# Patient Record
Sex: FEMALE | Race: WHITE | NOT HISPANIC OR LATINO | Employment: PART TIME | URBAN - METROPOLITAN AREA
[De-identification: names, ages, dates, MRNs, and addresses within clinical notes are randomized per-mention and may not be internally consistent; named-entity substitution may affect disease eponyms.]

---

## 2022-07-01 ENCOUNTER — INITIAL PRENATAL (OUTPATIENT)
Dept: OBSTETRICS AND GYNECOLOGY | Facility: CLINIC | Age: 22
End: 2022-07-01

## 2022-07-01 VITALS
BODY MASS INDEX: 23.25 KG/M2 | WEIGHT: 118.4 LBS | HEIGHT: 60 IN | DIASTOLIC BLOOD PRESSURE: 80 MMHG | SYSTOLIC BLOOD PRESSURE: 127 MMHG

## 2022-07-01 DIAGNOSIS — S32.591D CLOSED FRACTURE OF MULTIPLE RAMI OF RIGHT PUBIS WITH ROUTINE HEALING, SUBSEQUENT ENCOUNTER: ICD-10-CM

## 2022-07-01 DIAGNOSIS — Z34.00 SUPERVISION OF NORMAL FIRST PREGNANCY, ANTEPARTUM: Primary | ICD-10-CM

## 2022-07-01 DIAGNOSIS — N63.0 BREAST MASS IN FEMALE: ICD-10-CM

## 2022-07-01 DIAGNOSIS — R11.0 NAUSEA: ICD-10-CM

## 2022-07-01 PROBLEM — F39 MOOD DISORDER: Status: ACTIVE | Noted: 2021-06-29

## 2022-07-01 LAB
B-HCG UR QL: POSITIVE
EXPIRATION DATE: ABNORMAL
GLUCOSE UR STRIP-MCNC: NEGATIVE MG/DL
INTERNAL NEGATIVE CONTROL: NEGATIVE
INTERNAL POSITIVE CONTROL: POSITIVE
Lab: ABNORMAL
PROT UR STRIP-MCNC: NEGATIVE MG/DL

## 2022-07-01 PROCEDURE — 99204 OFFICE O/P NEW MOD 45 MIN: CPT | Performed by: OBSTETRICS & GYNECOLOGY

## 2022-07-01 PROCEDURE — 81025 URINE PREGNANCY TEST: CPT | Performed by: OBSTETRICS & GYNECOLOGY

## 2022-07-01 RX ORDER — PROMETHAZINE HYDROCHLORIDE 12.5 MG/1
12.5 TABLET ORAL EVERY 6 HOURS PRN
Qty: 30 TABLET | Refills: 0 | Status: SHIPPED | OUTPATIENT
Start: 2022-07-01 | End: 2022-12-21

## 2022-07-01 RX ORDER — VITAMIN C, CALCIUM, IRON, VITAMIN D3, VITAMIN E, VITAMIN B1, VITAMIN B2, VITAMIN B3, VITAMIN B6, FOLIC ACID, IODINE, ZINC, COPPER, DOCUSATE SODIUM, DOCOSAHEXAENOIC ACID (DHA) 27-1-50 MG
1 KIT ORAL DAILY
Qty: 90 EACH | Refills: 4 | Status: SHIPPED | OUTPATIENT
Start: 2022-07-01 | End: 2022-12-21

## 2022-07-01 NOTE — PATIENT INSTRUCTIONS
"Nausea/vomiting in pregnancy:  To help with nausea and vomiting you may try the following over the counter products:  Tatiana chews, tatiana tea, tatiana gum  Mint tea, peppermint gum or candy  B6/Doxylamine: to be taken daily, not just when symptoms occur  Pyridoxine (also known as B6): 10 to 25 mg orally every six to eight hours; the maximum treatment dose suggested for pregnant women is 200 mg/day.  Doxylamine (available in some over-the-counter sleeping pills (eg, Unisom Sleep Tabs) and as an antihistamine chewable tablet (Aldex AN)). One-half of the 25 mg over-the-counter tablet or two chewable 5 mg tablets can be used off-label as an antiemetic  The Association of [employer] has released a smart phone application that can help you monitor and manage your symptoms.  The Application is \"Managing NVP,\" and has a green icon that says \"APGO WELLMOM.\"    If these do not work, we may need to try prescription medications.    Please contact us if you are unable to tolerate liquids (even sips of water) for over six to eight hours, have weight loss of over 10% of your body weight, blood in vomit, fever (temp of 100.4 or higher), or other concerning symptoms arise.       The following are CPT codes for the optional tests in pregnancy:  Cystic fibrosis screenin  Spinal Muscular atrophy screening 44036  Cell free DNA (Trisomy 21, 18, 13 and sex chromosomes) 12280    The ICD 10 code for most pregnancies is Z34.90     Travel During Pregnancy:  Always use seatbelts.  A lap belt should be worn below the abdomen (across the hips) and the shoulder belt should be worn across the center of your chest (between the breasts) away from your neck.  Do not put the shoulder belt under your arm or behind your back.  Pull any slack out of the belt.  Air travel is safe in most uncomplicated pregnancies, but we do not recommend air travel past 36 weeks.  Airlines may also have restrictions, so check with your " airline before flying.  For some international flights, the travel cut off may be as early as 28 weeks gestation, and some airlines may require letters from your physician.  When going on long trips in car, plane, train, or bus, frequent ambulation is important to prevent blood clots in legs and/or lungs.  The following may help with prevention of blood clots in legs: Drink lots of fluid, wear loose-fitting clothing, walk and stretch at regular intervals.    Avoid areas with Zika outbreaks.  For the latest information, you may visit: www.cdc.gov/travel/notices/.  Resources: , , Committee Opinion 455  Nutrition during pregnancy  Average weight gain during pregnancy is based on your pre-pregnancy body mass index (BMI).  See below for recommended weight gain:  Underweight (BMI <18.5), we recommend 28 to 40lb weight gain  Normal weight (BMI 18.5 to 24.9), we recommend a 25 to 35lb weight gain  Overweight (BMI 25-29.9), we recommend a 15 to 25lb weight gain  Obese (BMI >30), we recommend keeping weight gain under 20 lbs.    You should speak with your physician regarding your specific weight goals for this pregnancy.   Foods to avoid include:   Fish: avoid certain types of fish such as shark, swordfish, jonathan mackerel, tilefish.  Limit white (albacore) tuna to 6 oz per week.  Choose fish and shellfish such as shrimp, salmon, catfish, Century.  Food-borne illness: Pregnant women are much more likely to get Listeriosis than non-pregnant women.  To help prevent this, avoid eating unpasteurized milk and unpasteurized milk products, hot dogs/lunch meat/cold cuts unless they are heated until steaming right before serving, refrigerated meat spreads, refrigerated smoked seafood, raw or undercooked seafood/eggs/meat.   Vitamin D helps development of the baby’s bones and teeth.  Good sources of Vitamin D include milk fortified with Vitamin D and fatty fish such as salmon.    Folic acid, also known as folate, helps  develop the baby’s brain and spine.  You should make sure your vitamin contains extra folic acid - at least 400mcg.    Iron helps make red blood cells.  You need to make extra red blood cell sin pregnancy.  We recommend eating Iron-rich foods such as lean red meat, poultry, fish, dried beans and peas, iron-fortified cereals, and prune juice.  You may be recommended an iron supplement.  If so, it is absorbed more easily if taken with vitamin C-rich foods such as citrus fruits or tomatoes.    It is important to eat a well balanced diet.  A good recourse for nutrition recommendations is: www.choosemyplate.gov.  Limit caffeine intake to 200mg daily.  Some coffees/teas/sodas have very different levels of caffeine per serving, so check the nutrition labeling.   Resources: , Committee Opinion 548  Exercise during pregnancy  If you are healthy and your pregnancy is normal, it is safe to continue or start most types of exercise.   Physical activity does not increase your risk of miscarriage, low birth weight or early delivery, but you should discuss specific limitations or any complications with your physician.    Benefits of exercise during pregnancy include: reduced back pain, less constipation, promotes overall health and healthy weight gain which may decrease risks for certain pregnancy complications such as diabetes and/or preeclampsia.  The CDC recommends 150 minutes of moderate intensity aerobic exercise per week.  Moderate intensity means that you are moving enough to raise your heart rate and start sweating, but you can talk normally.  Brisk walking, swimming/water workouts, modified yoga/pilates, and use of elliptical machines and/or stationary bikes are examples of aerobic activity.    Precautions:  Stay hydrated.  Drink plenty of water before, during and after your workout  Wear supportive clothing such as a sports bra  Do not become overheated.  Do not exercise outside when it is very hot or humid  Avoid  lying flat on your back as much as possible  AVOID contact sports, skydiving, sports that risk falling (such as skiing, surfing, off-road cycling, gymnastics, horseback riding), hot yoga/hot pilates, scuba diving  Stop exercising if you experience: bleeding from the vagina, feeling dizzy/faint, shortness of breath before starting exercise, chest pain, headache, muscle weakness, calf pain or swelling, regular uterine contractions, fluid leaking from the vagina.    Postpartum exercise: Continuing exercise after you deliver your baby will help boost your energy, strengthen muscles, promote better sleep, and relieve stress.  It also may be useful in preventing postpartum depression.  150 minutes of moderate-intensity aerobic activity is recommended.  Types of exercise and when you can start a regular exercise routine may be limited by the type of delivery you had.  Please discuss with your physician prior to resuming or starting exercise.    Resources: ,   Back pain during pregnancy  Back pain is very common during pregnancy.  It may arise due to strain on your back muscles, weakness of the abdominal muscles, and pregnancy hormones.    To prevent back pain:  Wear shoes with good support. Flat shoes and high heels may not have good arch support.  Consider a firm mattress  Use good lifting practices.  Do not bend from the waist to pick things up.  Squat down and bend your knees, keeping a straight back.  Sit in chairs with good back support or use a small pillow behind your lower back.    Sleep on your side with one or two pillows between your legs  To ease back pain:   Exercise can help stretch strained muscles and strengthen weak muscles to promote good posture  Contact your health care provider with severe pain, pain that persists for more than 2 weeks, fever, burning during urination, or vaginal bleeding.  Resources:

## 2022-07-01 NOTE — PROGRESS NOTES
Initial OB Visit    Chief Complaint   Patient presents with   • Initial Prenatal Visit        Odette Gar is being seen today for her first obstetrical visit.  She is a 22 y.o.    6w1d gestation by unsure LMP  FOB: [place] [place], boyfriend here with her today  This is not a planned pregnancy.   OB History    Para Term  AB Living   1             SAB IAB Ectopic Molar Multiple Live Births                    # Outcome Date GA Lbr Mitch/2nd Weight Sex Delivery Anes PTL Lv   1 Current                Current obstetric complaints: nausea, some vomiting   Prior obstetric issues, potential pregnancy concerns: N/A  Family history of genetic issues (includes FOB): denies  Prior infections concerning in pregnancy (Rash, fever since LMP): denies  Varicella Hx:immune by vaccination  Prior genetic testing: reports negative cancer screening, denies h/o CF or SMA screening  History of abnormal pap smears: denies  History of STIs: chlamydia - 2 years ago  History of HSV in self or partner? denies  Prepregnancy weight 123lb      Past Medical History:   Diagnosis Date   • Pelvic fracture (HCC)     See at [facility]        Past Surgical History:   Procedure Laterality Date   • KNEE ACL RECONSTRUCTION Left          Current Outpatient Medications:   •  Prenat w/o A-FeCbGl-DSS-FA-DHA (PNV OB+DHA) 27-1 & 250 MG misc, Take 1 tablet by mouth Daily., Disp: 90 each, Rfl: 4  •  PRENATAL GUMMY VITAMIN, Chew 1 each Daily., Disp: , Rfl:   •  promethazine (PHENERGAN) 12.5 MG tablet, Take 1 tablet by mouth Every 6 (Six) Hours As Needed for Nausea or Vomiting., Disp: 30 tablet, Rfl: 0    No Known Allergies    Social History     Socioeconomic History   • Marital status: Single   Tobacco Use   • Smoking status: Former Smoker   Substance and Sexual Activity   • Alcohol use: Not Currently   • Drug use: Yes     Types: Marijuana     Comment: stopped 22       Family History   Problem Relation Age of Onset   • Breast cancer Paternal  "Grandmother 40   • Uterine cancer Maternal Great-Grandmother    • Ovarian cancer Maternal Great-Grandmother    • Lung cancer Maternal Great-Grandmother        Review of systems   See HPI       Objective    /80   Ht 152.4 cm (60\")   Wt 53.7 kg (118 lb 6.4 oz)   LMP 05/19/2022   BMI 23.12 kg/m²       General Appearance:    Alert, cooperative, in no acute distress, habitus normal   Head:    Normocephalic, without obvious abnormality, atraumatic   Eyes:            Lids and lashes normal, conjunctivae and sclerae normal, no   icterus, no pallor, corneas clear   Ears:    Ears appear intact with no abnormalities noted       Neck:   No adenopathy, supple, trachea midline, no thyromegaly   Back:     No kyphosis present, no scoliosis present,                       Lungs:     Clear to auscultation,respirations regular, even and                   unlabored    Heart:    Regular rhythm and normal rate, normal S1 and S2, no            murmur, no gallop, no rub, no click   Breast Exam:    Mobile 2cm mass at 12 o'clock on right breast, mobile <0.5cm mass feeling superficial at 3 o'clock on left breast; No nipple discharge   Abdomen:     Normal bowel sounds, no masses, no organomegaly, soft        non-tender, non-distended, no guarding, no rebound                 tenderness   Genitalia:    Vulva - BUS-WNL, NEFG    Vagina - No discharge, No bleeding    Cervix - No Lesions, closed     Uterus - Consistent with 6 weeks    Adnexa - No masses, NT    Pelvimetry - clinically adequate, gynecoid pelvis     Extremities:   Moves all extremities well, no edema, no cyanosis, no              redness   Pulses:   Pulses palpable and equal bilaterally   Skin:   No bleeding, bruising or rash   Lymph nodes:   No palpable adenopathy   Neurologic:   Sensation intact, A&O times 3      Assessment  Pregnancy at 6w1d   Diagnosis Plan   1. Supervision of normal first pregnancy, antepartum  Hemoglobin A1c    OB Panel With HIV    Chlamydia trachomatis, " Neisseria gonorrhoeae, Trichomonas vaginalis, PCR - Swab, Cervix    Drug Profile Urine - 9 Drugs - Urine, Clean Catch    Urine Culture - Urine, Urine, Clean Catch    Inheritest Core(CF97,SMA,FraX) - Blood,    HCG, B-subunit, Quantitative    US Ob Transvaginal    POC Pregnancy, Urine   2. Pelvic Rami Fracture June 2021, healed     3. Nausea     4. Breast mass in female  US Breast Bilateral Complete        Plan    Initial labs drawn, GC/CHL screen done  Patient is on Prenatal vitamins  Problem list reviewed and updated.  Reviewed routine prenatal care with the office to include but not limited to:   Questions regarding activity and food restrictions, avoidance of alcohol, tobacco and drugs and saunas/hot tubs.  Reviewed nature of practice and hospital.  Reviewed recommended follow up, importance of compliance with care. We reviewed testing in pregnancy including HIV testing and urine drug screen.    Reviewed aneuploidy screening and CF/SMA screening.  We reviewed limitations of testing, possibility of false positive/negative results, possible need for other tests as indicated.    Desires CF/SMA/Fragile X  Counseled on limitations of ultrasound in pregnancy in detecting aneuploidy/fetal anomalies    Reviewed note from [facility] July 2021 - rami fracture healed and well reduced.  Breast mass- ordered ultrasound bilateral. Was told she had cyst on right breast in past but no prior imaging  Reviewed Body mass index is 23.12 kg/m²..  In pregnancy, her recommended weight gain is 35lbs.  In the first trimester, caloric demand is typically not increased.  In the second and third trimester, the increased demand is approximately 350 and 450 calories respectively.    All questions answered.   Return in about 4 weeks (around 7/29/2022).      Gladis Slater MD

## 2022-07-02 LAB
ABO GROUP BLD: NORMAL
BASOPHILS # BLD AUTO: 0.1 X10E3/UL (ref 0–0.2)
BASOPHILS NFR BLD AUTO: 1 %
BLD GP AB SCN SERPL QL: NEGATIVE
EOSINOPHIL # BLD AUTO: 0 X10E3/UL (ref 0–0.4)
EOSINOPHIL NFR BLD AUTO: 0 %
ERYTHROCYTE [DISTWIDTH] IN BLOOD BY AUTOMATED COUNT: 11.7 % (ref 11.7–15.4)
HBA1C MFR BLD: 5.2 % (ref 4.8–5.6)
HBV SURFACE AG SERPL QL IA: NEGATIVE
HCG INTACT+B SERPL-ACNC: NORMAL MIU/ML
HCT VFR BLD AUTO: 37.9 % (ref 34–46.6)
HCV AB S/CO SERPL IA: <0.1 S/CO RATIO (ref 0–0.9)
HGB BLD-MCNC: 12.9 G/DL (ref 11.1–15.9)
HIV 1+2 AB+HIV1 P24 AG SERPL QL IA: NON REACTIVE
IMM GRANULOCYTES # BLD AUTO: 0 X10E3/UL (ref 0–0.1)
IMM GRANULOCYTES NFR BLD AUTO: 0 %
LYMPHOCYTES # BLD AUTO: 1.4 X10E3/UL (ref 0.7–3.1)
LYMPHOCYTES NFR BLD AUTO: 23 %
MCH RBC QN AUTO: 30.3 PG (ref 26.6–33)
MCHC RBC AUTO-ENTMCNC: 34 G/DL (ref 31.5–35.7)
MCV RBC AUTO: 89 FL (ref 79–97)
MONOCYTES # BLD AUTO: 0.6 X10E3/UL (ref 0.1–0.9)
MONOCYTES NFR BLD AUTO: 10 %
NEUTROPHILS # BLD AUTO: 4 X10E3/UL (ref 1.4–7)
NEUTROPHILS NFR BLD AUTO: 66 %
PLATELET # BLD AUTO: 226 X10E3/UL (ref 150–450)
RBC # BLD AUTO: 4.26 X10E6/UL (ref 3.77–5.28)
RH BLD: NEGATIVE
RPR SER QL: NON REACTIVE
RUBV IGG SERPL IA-ACNC: 1.48 INDEX
WBC # BLD AUTO: 6.1 X10E3/UL (ref 3.4–10.8)

## 2022-07-03 LAB
BACTERIA UR CULT: NORMAL
BACTERIA UR CULT: NORMAL

## 2022-07-04 ENCOUNTER — TELEPHONE (OUTPATIENT)
Dept: OBSTETRICS AND GYNECOLOGY | Facility: CLINIC | Age: 22
End: 2022-07-04

## 2022-07-05 ENCOUNTER — TELEPHONE (OUTPATIENT)
Dept: OBSTETRICS AND GYNECOLOGY | Facility: CLINIC | Age: 22
End: 2022-07-05

## 2022-07-05 LAB
C TRACH RRNA SPEC QL NAA+PROBE: NEGATIVE
N GONORRHOEA RRNA SPEC QL NAA+PROBE: NEGATIVE
T VAGINALIS RRNA SPEC QL NAA+PROBE: NEGATIVE

## 2022-07-05 NOTE — TELEPHONE ENCOUNTER
Please let patient know that results so far are normal.  Her blood type is AB negative.  In this case, we would recommend a shot called Rhogam if she were to have bleeding or standardly at 28 weeks.

## 2022-07-08 ENCOUNTER — TELEPHONE (OUTPATIENT)
Dept: OBSTETRICS AND GYNECOLOGY | Facility: CLINIC | Age: 22
End: 2022-07-08

## 2022-07-08 LAB
AMPHETAMINES UR QL SCN: NEGATIVE NG/ML
BARBITURATES UR QL SCN: NEGATIVE NG/ML
BENZODIAZ UR QL: NEGATIVE NG/ML
BZE UR QL: NEGATIVE NG/ML
CANNABINOIDS UR CFM-MCNC: POSITIVE NG/ML
METHADONE UR QL SCN: NEGATIVE NG/ML
OPIATES UR QL: NEGATIVE NG/ML
PCP UR QL: NEGATIVE NG/ML
PROPOXYPH UR QL SCN: NEGATIVE NG/ML

## 2022-07-09 ENCOUNTER — HOSPITAL ENCOUNTER (OUTPATIENT)
Facility: HOSPITAL | Age: 22
Setting detail: OBSERVATION
Discharge: HOME | End: 2022-07-10
Attending: EMERGENCY MEDICINE | Admitting: EMERGENCY MEDICINE

## 2022-07-09 DIAGNOSIS — O21.1 HYPEREMESIS GRAVIDARUM BEFORE END OF 22 WEEK GESTATION WITH CARBOHYDRATE DEPLETION: Primary | ICD-10-CM

## 2022-07-09 PROBLEM — O21.0 HYPEREMESIS GRAVIDARUM: Status: ACTIVE | Noted: 2022-07-09

## 2022-07-09 LAB
ALBUMIN SERPL-MCNC: 4.7 G/DL (ref 3.5–5.2)
ALBUMIN/GLOB SERPL: 1.8 G/DL
ALP SERPL-CCNC: 53 U/L (ref 39–117)
ALT SERPL W P-5'-P-CCNC: 10 U/L (ref 1–33)
ANION GAP SERPL CALCULATED.3IONS-SCNC: 13 MMOL/L (ref 5–15)
AST SERPL-CCNC: 11 U/L (ref 1–32)
BASOPHILS # BLD AUTO: 0.05 10*3/MM3 (ref 0–0.2)
BASOPHILS NFR BLD AUTO: 0.5 % (ref 0–1.5)
BILIRUB SERPL-MCNC: 0.5 MG/DL (ref 0–1.2)
BILIRUB UR QL STRIP: NEGATIVE
BUN SERPL-MCNC: 10 MG/DL (ref 6–20)
BUN/CREAT SERPL: 16.1 (ref 7–25)
CALCIUM SPEC-SCNC: 9.7 MG/DL (ref 8.6–10.5)
CHLORIDE SERPL-SCNC: 102 MMOL/L (ref 98–107)
CLARITY UR: CLEAR
CO2 SERPL-SCNC: 23 MMOL/L (ref 22–29)
COLOR UR: YELLOW
CREAT SERPL-MCNC: 0.62 MG/DL (ref 0.57–1)
DEPRECATED RDW RBC AUTO: 37 FL (ref 37–54)
EGFRCR SERPLBLD CKD-EPI 2021: 129.3 ML/MIN/1.73
EOSINOPHIL # BLD AUTO: 0.01 10*3/MM3 (ref 0–0.4)
EOSINOPHIL NFR BLD AUTO: 0.1 % (ref 0.3–6.2)
ERYTHROCYTE [DISTWIDTH] IN BLOOD BY AUTOMATED COUNT: 11.6 % (ref 12.3–15.4)
GLOBULIN UR ELPH-MCNC: 2.6 GM/DL
GLUCOSE SERPL-MCNC: 91 MG/DL (ref 65–99)
GLUCOSE UR STRIP-MCNC: NEGATIVE MG/DL
HCG INTACT+B SERPL-ACNC: NORMAL MIU/ML
HCT VFR BLD AUTO: 38.6 % (ref 34–46.6)
HGB BLD-MCNC: 13 G/DL (ref 12–15.9)
HGB UR QL STRIP.AUTO: NEGATIVE
HOLD SPECIMEN: NORMAL
IMM GRANULOCYTES # BLD AUTO: 0.02 10*3/MM3 (ref 0–0.05)
IMM GRANULOCYTES NFR BLD AUTO: 0.2 % (ref 0–0.5)
KETONES UR QL STRIP: ABNORMAL
LEUKOCYTE ESTERASE UR QL STRIP.AUTO: NEGATIVE
LIPASE SERPL-CCNC: 17 U/L (ref 13–60)
LYMPHOCYTES # BLD AUTO: 1.35 10*3/MM3 (ref 0.7–3.1)
LYMPHOCYTES NFR BLD AUTO: 14.6 % (ref 19.6–45.3)
MCH RBC QN AUTO: 29.9 PG (ref 26.6–33)
MCHC RBC AUTO-ENTMCNC: 33.7 G/DL (ref 31.5–35.7)
MCV RBC AUTO: 88.7 FL (ref 79–97)
MONOCYTES # BLD AUTO: 0.71 10*3/MM3 (ref 0.1–0.9)
MONOCYTES NFR BLD AUTO: 7.7 % (ref 5–12)
NEUTROPHILS NFR BLD AUTO: 7.1 10*3/MM3 (ref 1.7–7)
NEUTROPHILS NFR BLD AUTO: 76.9 % (ref 42.7–76)
NITRITE UR QL STRIP: NEGATIVE
NRBC BLD AUTO-RTO: 0 /100 WBC (ref 0–0.2)
PH UR STRIP.AUTO: 6 [PH] (ref 5–8)
PLATELET # BLD AUTO: 231 10*3/MM3 (ref 140–450)
PMV BLD AUTO: 10.6 FL (ref 6–12)
POTASSIUM SERPL-SCNC: 4.3 MMOL/L (ref 3.5–5.2)
PROT SERPL-MCNC: 7.3 G/DL (ref 6–8.5)
PROT UR QL STRIP: ABNORMAL
RBC # BLD AUTO: 4.35 10*6/MM3 (ref 3.77–5.28)
SARS-COV-2 RNA PNL SPEC NAA+PROBE: NOT DETECTED
SODIUM SERPL-SCNC: 138 MMOL/L (ref 136–145)
SP GR UR STRIP: >=1.03 (ref 1–1.03)
UROBILINOGEN UR QL STRIP: ABNORMAL
WBC NRBC COR # BLD: 9.24 10*3/MM3 (ref 3.4–10.8)
WHOLE BLOOD HOLD COAG: NORMAL
WHOLE BLOOD HOLD SPECIMEN: NORMAL

## 2022-07-09 PROCEDURE — 80053 COMPREHEN METABOLIC PANEL: CPT

## 2022-07-09 PROCEDURE — 87635 SARS-COV-2 COVID-19 AMP PRB: CPT | Performed by: EMERGENCY MEDICINE

## 2022-07-09 PROCEDURE — 81003 URINALYSIS AUTO W/O SCOPE: CPT | Performed by: EMERGENCY MEDICINE

## 2022-07-09 PROCEDURE — 96375 TX/PRO/DX INJ NEW DRUG ADDON: CPT

## 2022-07-09 PROCEDURE — C9803 HOPD COVID-19 SPEC COLLECT: HCPCS

## 2022-07-09 PROCEDURE — 99283 EMERGENCY DEPT VISIT LOW MDM: CPT

## 2022-07-09 PROCEDURE — 96361 HYDRATE IV INFUSION ADD-ON: CPT

## 2022-07-09 PROCEDURE — 25010000002 METOCLOPRAMIDE PER 10 MG: Performed by: EMERGENCY MEDICINE

## 2022-07-09 PROCEDURE — 85025 COMPLETE CBC W/AUTO DIFF WBC: CPT

## 2022-07-09 PROCEDURE — 96374 THER/PROPH/DIAG INJ IV PUSH: CPT

## 2022-07-09 PROCEDURE — 84702 CHORIONIC GONADOTROPIN TEST: CPT | Performed by: EMERGENCY MEDICINE

## 2022-07-09 PROCEDURE — 25010000002 ONDANSETRON PER 1 MG: Performed by: PHYSICIAN ASSISTANT

## 2022-07-09 PROCEDURE — G0378 HOSPITAL OBSERVATION PER HR: HCPCS

## 2022-07-09 PROCEDURE — 83690 ASSAY OF LIPASE: CPT

## 2022-07-09 PROCEDURE — 25010000002 DIPHENHYDRAMINE PER 50 MG: Performed by: EMERGENCY MEDICINE

## 2022-07-09 RX ORDER — DIPHENHYDRAMINE HYDROCHLORIDE 50 MG/ML
25 INJECTION INTRAMUSCULAR; INTRAVENOUS ONCE
Status: COMPLETED | OUTPATIENT
Start: 2022-07-09 | End: 2022-07-09

## 2022-07-09 RX ORDER — ONDANSETRON 2 MG/ML
4 INJECTION INTRAMUSCULAR; INTRAVENOUS EVERY 6 HOURS PRN
Status: DISCONTINUED | OUTPATIENT
Start: 2022-07-09 | End: 2022-07-10 | Stop reason: HOSPADM

## 2022-07-09 RX ORDER — PRENATAL VIT/IRON FUM/FOLIC AC 27MG-0.8MG
1 TABLET ORAL DAILY
Refills: 4 | Status: DISCONTINUED | OUTPATIENT
Start: 2022-07-10 | End: 2022-07-10 | Stop reason: HOSPADM

## 2022-07-09 RX ORDER — SODIUM CHLORIDE 0.9 % (FLUSH) 0.9 %
10 SYRINGE (ML) INJECTION AS NEEDED
Status: DISCONTINUED | OUTPATIENT
Start: 2022-07-09 | End: 2022-07-10 | Stop reason: HOSPADM

## 2022-07-09 RX ORDER — ONDANSETRON 4 MG/1
4 TABLET, FILM COATED ORAL EVERY 6 HOURS PRN
Status: DISCONTINUED | OUTPATIENT
Start: 2022-07-09 | End: 2022-07-10 | Stop reason: HOSPADM

## 2022-07-09 RX ORDER — METOCLOPRAMIDE HYDROCHLORIDE 5 MG/ML
5 INJECTION INTRAMUSCULAR; INTRAVENOUS ONCE
Status: COMPLETED | OUTPATIENT
Start: 2022-07-09 | End: 2022-07-09

## 2022-07-09 RX ORDER — SODIUM CHLORIDE 0.9 % (FLUSH) 0.9 %
10 SYRINGE (ML) INJECTION EVERY 12 HOURS SCHEDULED
Status: DISCONTINUED | OUTPATIENT
Start: 2022-07-09 | End: 2022-07-10 | Stop reason: HOSPADM

## 2022-07-09 RX ORDER — DEXTROSE AND SODIUM CHLORIDE 5; .9 G/100ML; G/100ML
250 INJECTION, SOLUTION INTRAVENOUS CONTINUOUS
Status: DISCONTINUED | OUTPATIENT
Start: 2022-07-09 | End: 2022-07-10 | Stop reason: HOSPADM

## 2022-07-09 RX ORDER — DOXYLAMINE SUCCINATE AND PYRIDOXINE HYDROCHLORIDE, DELAYED RELEASE TABLETS 10 MG/10 MG 10; 10 MG/1; MG/1
2 TABLET, DELAYED RELEASE ORAL NIGHTLY
Status: DISCONTINUED | OUTPATIENT
Start: 2022-07-09 | End: 2022-07-10 | Stop reason: HOSPADM

## 2022-07-09 RX ADMIN — ONDANSETRON 4 MG: 2 INJECTION INTRAMUSCULAR; INTRAVENOUS at 19:21

## 2022-07-09 RX ADMIN — DIPHENHYDRAMINE HYDROCHLORIDE 25 MG: 50 INJECTION, SOLUTION INTRAMUSCULAR; INTRAVENOUS at 16:41

## 2022-07-09 RX ADMIN — METOCLOPRAMIDE HYDROCHLORIDE 5 MG: 5 INJECTION INTRAMUSCULAR; INTRAVENOUS at 16:42

## 2022-07-09 RX ADMIN — Medication 10 ML: at 20:48

## 2022-07-09 RX ADMIN — DEXTROSE AND SODIUM CHLORIDE 250 ML/HR: 5; 900 INJECTION, SOLUTION INTRAVENOUS at 19:22

## 2022-07-09 RX ADMIN — DOXYLAMINE SUCCINATE AND PYRIDOXINE HYDROCHLORIDE, DELAYED RELEASE TABLETS 10 MG/10 MG 2 TABLET: 10; 10 TABLET, DELAYED RELEASE ORAL at 20:46

## 2022-07-09 NOTE — ED NOTES
.  Nursing report ED to floor  Odette Gar  22 y.o.  female    HPI :   Chief Complaint   Patient presents with   • Vomiting       Admitting doctor:   Get Herring MD    Admitting diagnosis:   The encounter diagnosis was Hyperemesis gravidarum before end of 22 week gestation with carbohydrate depletion.    Code status:   Current Code Status     Date Active Code Status Order ID Comments User Context       Not on file    Advance Care Planning Activity          Allergies:   Patient has no known allergies.    Intake and Output  No intake or output data in the 24 hours ending 07/09/22 1740    Weight:   There were no vitals filed for this visit.    Most recent vitals:   Vitals:    07/09/22 1308 07/09/22 1310   BP:  117/82   Pulse: 86    Resp: 18    Temp: 97.5 °F (36.4 °C)    SpO2: 96%        Active LDAs/IV Access:   Lines, Drains & Airways     Active LDAs     Name Placement date Placement time Site Days    Peripheral IV 07/09/22 1416 Left Antecubital 07/09/22  1416  Antecubital  less than 1                Labs (abnormal labs have a star):   Labs Reviewed   URINALYSIS W/ MICROSCOPIC IF INDICATED (NO CULTURE) - Abnormal; Notable for the following components:       Result Value    Ketones, UA 80 mg/dL (3+) (*)     Protein, UA Trace (*)     All other components within normal limits    Narrative:     Urine microscopic not indicated.   CBC WITH AUTO DIFFERENTIAL - Abnormal; Notable for the following components:    RDW 11.6 (*)     Neutrophil % 76.9 (*)     Lymphocyte % 14.6 (*)     Eosinophil % 0.1 (*)     Neutrophils, Absolute 7.10 (*)     All other components within normal limits   LIPASE - Normal   COVID PRE-OP / PRE-PROCEDURE SCREENING ORDER (NO ISOLATION)    Narrative:     The following orders were created for panel order COVID PRE-OP / PRE-PROCEDURE SCREENING ORDER (NO ISOLATION) - Swab, Nasopharynx.  Procedure                               Abnormality         Status                     ---------                                -----------         ------                     COVID-19, RENAN IN-HOUSE...[[id number]]                                                   Please view results for these tests on the individual orders.   COVID-19,BH RENAN IN-HOUSE CEPHEID/ELLA, NP SWAB IN TRANSPORT MEDIA 8-12 HR TAT   RAINBOW DRAW    Narrative:     The following orders were created for panel order Burns Draw.  Procedure                               Abnormality         Status                     ---------                               -----------         ------                     Green Top (Gel)[[id number]]                                  Final result               Lavender Top[[id number]]                                     Final result               Gold Top - SST[[id number]]                                                              Light Blue Top[[id number]]                                   Final result                 Please view results for these tests on the individual orders.   COMPREHENSIVE METABOLIC PANEL    Narrative:     GFR Normal >60  Chronic Kidney Disease <60  Kidney Failure <15     HCG, QUANTITATIVE, PREGNANCY    Narrative:     HCG Ranges by Gestational Age    Females - non-pregnant premenopausal   </= 1mIU/mL HCG  Females - postmenopausal               </= 7mIU/mL HCG    3 Weeks       5.4   -      72 mIU/mL  4 Weeks      10.2   -     708 mIU/mL  5 Weeks       217   -   8,245 mIU/mL  6 Weeks       152   -  32,177 mIU/mL  7 Weeks     4,059   - 153,767 mIU/mL  8 Weeks    31,366   - 149,094 mIU/mL  9 Weeks    59,109   - 135,901 mIU/mL  10 Weeks   44,186   - 170,409 mIU/mL  12 Weeks   27,107   - 201,615 mIU/mL  14 Weeks   24,302   -  93,646 mIU/mL  15 Weeks   12,540   -  69,747 mIU/mL  16 Weeks    8,904   -  55,332 mIU/mL  17 Weeks    8,240   -  51,793 mIU/mL  18 Weeks    9,649   -  55,271 mIU/mL    Results may be falsely decreased if patient taking Biotin.     HCG, SERUM, QUALITATIVE   CBC AND DIFFERENTIAL    Narrative:     The  following orders were created for panel order CBC & Differential.  Procedure                               Abnormality         Status                     ---------                               -----------         ------                     CBC Auto Differential[[id number]]        Abnormal            Final result                 Please view results for these tests on the individual orders.   GREEN TOP   LAVENDER TOP   LIGHT BLUE TOP       EKG:   No orders to display       Meds given in ED:   Medications   sodium chloride 0.9 % flush 10 mL (has no administration in time range)   dextrose 5 % and sodium chloride 0.9 % infusion (has no administration in time range)   metoclopramide (REGLAN) injection 5 mg (5 mg Intravenous Given 7/9/22 1642)   diphenhydrAMINE (BENADRYL) injection 25 mg (25 mg Intravenous Given 7/9/22 1641)       Imaging results:  No radiology results for the last day    Ambulatory status:   ad zenon  Social issues:   Social History     Socioeconomic History   • Marital status: Single   Tobacco Use   • Smoking status: Former Smoker   Substance and Sexual Activity   • Alcohol use: Not Currently   • Drug use: Yes     Types: Marijuana     Comment: stopped 6/24/22       NIH Stroke Scale:        Nursing report ED to floor:

## 2022-07-09 NOTE — ED TRIAGE NOTES
PAtient to ER via car from home for n/v patient is 7 weeks preg. Patient states that this started yesterday    Patient wearing mask this RN in PPE

## 2022-07-09 NOTE — ED PROVIDER NOTES
EMERGENCY DEPARTMENT ENCOUNTER    Room Number:  111/1  Date of encounter:  7/9/2022  PCP: Provider, No Known  Historian: Patient      HPI:  Chief Complaint: Nausea and vomiting  A complete HPI/ROS/PMH/PSH/SH/FH are unobtainable due to: Nothing    Context: Odette Gar is a 22 y.o. female who presents to the ED c/o severe and persistent nausea and vomiting for the last several days.  Patient is proximately 7 weeks pregnant and follows with Dr. Gladis Slater.  This is her first pregnancy and she just began having morning sickness symptoms earlier this week.  She has little bit of lower abdominal cramping but no vaginal bleeding or discharge.  She has no dysuria and no diarrhea she states that she was prescribed some Phenergan which has not been helping because she cannot keep it down.  She cannot keep anything down by mouth currently.    She has not had a fever, no chest pain or shortness of breath.  No other associated symptoms.      PAST MEDICAL HISTORY  Active Ambulatory Problems     Diagnosis Date Noted   • Mood disorder (HCC) 06/29/2021   • Pelvic Rami Fracture June 2021, healed 07/01/2022   • Supervision of normal first pregnancy, antepartum 07/01/2022     Resolved Ambulatory Problems     Diagnosis Date Noted   • No Resolved Ambulatory Problems     Past Medical History:   Diagnosis Date   • Pelvic fracture (HCC)          PAST SURGICAL HISTORY  Past Surgical History:   Procedure Laterality Date   • KNEE ACL RECONSTRUCTION Left          FAMILY HISTORY  Family History   Problem Relation Age of Onset   • Breast cancer Paternal Grandmother 40   • Uterine cancer Maternal Great-Grandmother    • Ovarian cancer Maternal Great-Grandmother    • Lung cancer Maternal Great-Grandmother          SOCIAL HISTORY  Social History     Socioeconomic History   • Marital status: Single   Tobacco Use   • Smoking status: Former Smoker   Substance and Sexual Activity   • Alcohol use: Not Currently   • Drug use: Yes     Types: Marijuana      Comment: stopped 6/24/22         ALLERGIES  Patient has no known allergies.        REVIEW OF SYSTEMS  Review of Systems     All systems reviewed and negative except for those discussed in HPI.       PHYSICAL EXAM    I have reviewed the triage vital signs and nursing notes.    ED Triage Vitals   Temp Heart Rate Resp BP SpO2   07/09/22 1308 07/09/22 1308 07/09/22 1308 07/09/22 1310 07/09/22 1308   97.5 °F (36.4 °C) 86 18 117/82 96 %      Temp src Heart Rate Source Patient Position BP Location FiO2 (%)   -- -- -- -- --              Physical Exam  GENERAL: not distressed, not toxic in appearance   HENT: nares patent  EYES: no scleral icterus  CV: regular rhythm, regular rate  RESPIRATORY: normal effort  ABDOMEN: soft, nondistended nontender to palpation with normal bowel sounds  MUSCULOSKELETAL: no deformity  NEURO: alert, moves all extremities, follows commands  SKIN: warm, dry        LAB RESULTS  Recent Results (from the past 24 hour(s))   Comprehensive Metabolic Panel    Collection Time: 07/09/22  2:15 PM    Specimen: Blood   Result Value Ref Range    Glucose 91 65 - 99 mg/dL    BUN 10 6 - 20 mg/dL    Creatinine 0.62 0.57 - 1.00 mg/dL    Sodium 138 136 - 145 mmol/L    Potassium 4.3 3.5 - 5.2 mmol/L    Chloride 102 98 - 107 mmol/L    CO2 23.0 22.0 - 29.0 mmol/L    Calcium 9.7 8.6 - 10.5 mg/dL    Total Protein 7.3 6.0 - 8.5 g/dL    Albumin 4.70 3.50 - 5.20 g/dL    ALT (SGPT) 10 1 - 33 U/L    AST (SGOT) 11 1 - 32 U/L    Alkaline Phosphatase 53 39 - 117 U/L    Total Bilirubin 0.5 0.0 - 1.2 mg/dL    Globulin 2.6 gm/dL    A/G Ratio 1.8 g/dL    BUN/Creatinine Ratio 16.1 7.0 - 25.0    Anion Gap 13.0 5.0 - 15.0 mmol/L    eGFR 129.3 >60.0 mL/min/1.73   Lipase    Collection Time: 07/09/22  2:15 PM    Specimen: Blood   Result Value Ref Range    Lipase 17 13 - 60 U/L   hCG, Quantitative, Pregnancy    Collection Time: 07/09/22  2:15 PM    Specimen: Blood   Result Value Ref Range    HCG Quantitative 65,695.00 mIU/mL   [facility]  (Gel)    Collection Time: 07/09/22  2:15 PM   Result Value Ref Range    Extra Tube Hold for add-ons.    Lavender Top    Collection Time: 07/09/22  2:15 PM   Result Value Ref Range    Extra Tube hold for add-on    Light Blue Top    Collection Time: 07/09/22  2:15 PM   Result Value Ref Range    Extra Tube Hold for add-ons.    CBC Auto Differential    Collection Time: 07/09/22  2:15 PM    Specimen: Blood   Result Value Ref Range    WBC 9.24 3.40 - 10.80 10*3/mm3    RBC 4.35 3.77 - 5.28 10*6/mm3    Hemoglobin 13.0 12.0 - 15.9 g/dL    Hematocrit 38.6 34.0 - 46.6 %    MCV 88.7 79.0 - 97.0 fL    MCH 29.9 26.6 - 33.0 pg    MCHC 33.7 31.5 - 35.7 g/dL    RDW 11.6 (L) 12.3 - 15.4 %    RDW-SD 37.0 37.0 - 54.0 fl    MPV 10.6 6.0 - 12.0 fL    Platelets 231 140 - 450 10*3/mm3    Neutrophil % 76.9 (H) 42.7 - 76.0 %    Lymphocyte % 14.6 (L) 19.6 - 45.3 %    Monocyte % 7.7 5.0 - 12.0 %    Eosinophil % 0.1 (L) 0.3 - 6.2 %    Basophil % 0.5 0.0 - 1.5 %    Immature Grans % 0.2 0.0 - 0.5 %    Neutrophils, Absolute 7.10 (H) 1.70 - 7.00 10*3/mm3    Lymphocytes, Absolute 1.35 0.70 - 3.10 10*3/mm3    Monocytes, Absolute 0.71 0.10 - 0.90 10*3/mm3    Eosinophils, Absolute 0.01 0.00 - 0.40 10*3/mm3    Basophils, Absolute 0.05 0.00 - 0.20 10*3/mm3    Immature Grans, Absolute 0.02 0.00 - 0.05 10*3/mm3    nRBC 0.0 0.0 - 0.2 /100 WBC   Urinalysis With Microscopic If Indicated (No Culture) - Urine, Clean Catch    Collection Time: 07/09/22  4:08 PM    Specimen: Urine, Clean Catch   Result Value Ref Range    Color, UA Yellow Yellow, Straw    Appearance, UA Clear Clear    pH, UA 6.0 5.0 - 8.0    Specific Gravity, UA >=1.030 1.005 - 1.030    Glucose, UA Negative Negative    Ketones, UA 80 mg/dL (3+) (A) Negative    Bilirubin, UA Negative Negative    Blood, UA Negative Negative    Protein, UA Trace (A) Negative    Leuk Esterase, UA Negative Negative    Nitrite, UA Negative Negative    Urobilinogen, UA 1.0 E.U./dL 0.2 - 1.0 E.U./dL   COVID-19,[facility]  IN-HOUSE CEPHEID/ELLA NP SWAB IN TRANSPORT MEDIA 8-12 HR TAT - Swab, Nasopharynx    Collection Time: 07/09/22  5:46 PM    Specimen: Nasopharynx; Swab   Result Value Ref Range    COVID19 Not Detected Not Detected - Ref. Range       Ordered the above labs and independently reviewed the results.        RADIOLOGY  No Radiology Exams Resulted Within Past 24 Hours    I ordered the above noted radiological studies. Reviewed by me and discussed with radiologist.  See dictation for official radiology interpretation.      PROCEDURES    Procedures      MEDICATIONS GIVEN IN ER    Medications   sodium chloride 0.9 % flush 10 mL (has no administration in time range)   dextrose 5 % and sodium chloride 0.9 % infusion (250 mL/hr Intravenous New Bag 7/9/22 1922)   sodium chloride 0.9 % flush 10 mL (10 mL Intravenous Given 7/9/22 2048)   sodium chloride 0.9 % flush 10 mL (has no administration in time range)   ondansetron (ZOFRAN) tablet 4 mg ( Oral Not Given:  See Alt 7/9/22 1921)     Or   ondansetron (ZOFRAN) injection 4 mg (4 mg Intravenous Given 7/9/22 1921)   doxylamine-pyridoxine (DICLEGIS) EC tablet 2 tablet (2 tablets Oral Given 7/9/22 2046)   metoclopramide (REGLAN) injection 5 mg (5 mg Intravenous Given 7/9/22 1642)   diphenhydrAMINE (BENADRYL) injection 25 mg (25 mg Intravenous Given 7/9/22 1641)         PROGRESS, DATA ANALYSIS, CONSULTS, AND MEDICAL DECISION MAKING    All labs have been independently reviewed by me.  All radiology studies have been reviewed by me and discussed with radiologist dictating the report.   EKG's independently viewed and interpreted by me.  Discussion below represents my analysis of pertinent findings related to patient's condition, differential diagnosis, treatment plan and final disposition.        ED Course as of 07/09/22 2101   Sat Jul 09, 2022 2101 CBC and chemistry unremarkable [DP]   2101 Lipase normal [DP]   2101 Urinalysis with 80+ ketones [DP]   2101 Patient seems improved with Reglan  and Benadryl as well as IV fluids, but I think she could benefit from overnight hydration with increased substrate. [DP]   2101 Spoke with nurse practitioner in the observation unit who agrees to admit her for further hydration and symptomatic management. [DP]      ED Course User Index  [DP] Cal Salgado MD           PPE: The patient wore a surgical mask throughout the entire patient encounter. I wore an N95.    AS OF 21:01 EDT VITALS:    BP - 120/56  HR - 75  TEMP - 98 °F (36.7 °C) (Oral)  O2 SATS - 100%        DIAGNOSIS  Final diagnoses:   Hyperemesis gravidarum before end of 22 week gestation with carbohydrate depletion         DISPOSITION             Cal Salgado MD  07/09/22 2101

## 2022-07-09 NOTE — H&P
[facility]   HISTORY AND PHYSICAL    Patient Name: Odette Gar  : 2000  MRN: [id number]  Primary Care Physician:  Provider, No Known  Date of admission: 2022    Subjective   Subjective     Chief Complaint:   Chief Complaint   Patient presents with   • Vomiting         HPI:    Odette Gar is a 22 y.o. female who is G1, P0 who presented to the emergency department today complaining of intractable nausea and vomiting.  Patient states she has not been able to keep anything down for 1 day.  Patient states she cannot even keep water down.  She previously got a prescription for Zofran which she states was helping but then this ran out.  She saw her OB on  and was prescribed Phenergan which she states has not been helping.  Patient is approximately 7 weeks gestation and this is her first pregnancy.    ED evaluation reviewed, patient does have 80 minutes milligrams per deciliter of ketones in her urine.  Otherwise, laboratory evaluation largely within normal limits.  Patient denies chest pain, shortness of breath, palpitations, diarrhea, vaginal bleeding, vaginal discharge.    Review of Systems   All systems were reviewed and negative except for: What is discussed in the HPI    Personal History     Past Medical History:   Diagnosis Date   • Pelvic fracture (HCC)     See at [facility]        Past Surgical History:   Procedure Laterality Date   • KNEE ACL RECONSTRUCTION Left        Family History: family history includes Breast cancer (age of onset: 40) in her paternal grandmother; Lung cancer in her maternal great-grandmother; Ovarian cancer in her maternal great-grandmother; Uterine cancer in her maternal great-grandmother. Otherwise pertinent FHx was reviewed and not pertinent to current issue.    Social History:  reports that she has quit smoking. She does not have any smokeless tobacco history on file. She reports previous alcohol use. She reports current drug use. Drug: Marijuana.    Home  Medications:  PNV OB+DHA, PRENATAL, and promethazine    Allergies:  No Known Allergies    Objective   Objective     Vitals:   Temp:  [97.5 °F (36.4 °C)-98.4 °F (36.9 °C)] 98.4 °F (36.9 °C)  Heart Rate:  [76-86] 76  Resp:  [18] 18  BP: (117-121)/(63-82) 121/63  Physical Exam     GENERAL: 22 y.o. YO female a/o x 4, NAD  SKIN: Warm pink and dry   HEENT:  PERRL, EOM intact, conjunctiva normal, sclera clear  NECK: supple, no JVD  LUNGS: Clear to auscultation bilaterally without wheezes, rales or rhonchi.  No accessory muscle use and no nasal flaring.  CARDIAC:  Regular rate and rhythm, S1-S2.  No murmurs, rubs or gallops.  No peripheral edema.  Equal pulses bilaterally.  ABDOMEN: Soft, nontender, nondistended.  No guarding or rebound tenderness.  Normal bowel sounds.  MUSCULOSKELETAL: Moves all extremities well.  No deformity.  NEURO: Cranial nerves II through XII grossly intact.  No gross focal deficits.  Alert.  Normal speech and motor.  PSYCH: Normal mood and affect      Result Review    Result Review:  I have personally reviewed the results from the time of this admission to 7/9/2022 19:07 EDT and agree with these findings:  [x]  Laboratory list / accordion  []  Microbiology  []  Radiology  []  EKG/Telemetry   []  Cardiology/Vascular   []  Pathology  [x]  Old records  []  Other:  Most notable findings include: Urinalysis shows 80 mg/dL ketones    Assessment & Plan   Assessment / Plan     Brief Patient Summary:  Odette Gar is a 22 y.o. female who is being mated to observation unit for further evaluation of hyperemesis gravidarum    Active Hospital Problems:  Active Hospital Problems    Diagnosis    • Hyperemesis gravidarum      Plan:     Hyperemesis gravidarum, first trimester  -IV fluids  -Clear liquid diet  -Zofran as needed  -Start Diclegis tonight  -Consult OB/GYN  -Monitor      DVT prophylaxis:  Mechanical DVT prophylaxis orders are present.    CODE STATUS:    Level Of Support Discussed With: Patient  Code  Status (Patient has no pulse and is not breathing): CPR (Attempt to Resuscitate)  Medical Interventions (Patient has pulse or is breathing): Full Support    Admission Status:  I believe this patient meets observation status.    I wore an N95 mask, face shield, and gloves during this patient encounter. Patient also wearing a surgical mask throughout encounter. Hand hygeine performed before and after seeing the patient.    Electronically signed by JOVI Wu, 07/09/22, 7:07 PM EDT.

## 2022-07-09 NOTE — CASE MANAGEMENT/SOCIAL WORK
Discharge Planning Assessment  [facility]     Patient Name: Odette Gar  MRN: [id number]  Today's Date: 7/9/2022    Admit Date: 7/9/2022     Discharge Needs Assessment     Row Name 07/09/22 1740       Living Environment    People in Home significant other    Current Living Arrangements home    Primary Care Provided by self    Provides Primary Care For no one    Family Caregiver if Needed significant other    Quality of Family Relationships helpful;involved;supportive    Able to Return to Prior Arrangements yes       Resource/Environmental Concerns    Resource/Environmental Concerns none    Transportation Concerns none       Transition Planning    Patient/Family Anticipates Transition to home with family    Patient/Family Anticipated Services at Transition none    Transportation Anticipated family or friend will provide       Discharge Needs Assessment    Readmission Within the Last 30 Days no previous admission in last 30 days    Equipment Currently Used at Home none    Concerns to be Addressed no discharge needs identified    Anticipated Changes Related to Illness none    Equipment Needed After Discharge none               Discharge Plan     Row Name 07/09/22 4401       Plan    Plan Home upon discharge with S.O., who can provide transport. Patient is IND with IADL's and requires no AD for mobility. Patient uses [facility] on [address]. No discharge needs anticipated at this time.    Patient/Family in Agreement with Plan yes    Plan Comments Home upon discharge without services. Can arrange own transport. No needs anticipated.              Continued Care and Services - Admitted Since 7/9/2022    Coordination has not been started for this encounter.          Demographic Summary     Row Name 07/09/22 1738       General Information    Admission Type observation    Arrived From home    Required Notices Provided Observation Status Notice    Referral Source admission list;emergency department    Reason  for Consult discharge planning    Preferred Language English    General Information Comments Facesheet verified. Role of CCP explained. Appropriate PPE worn at all times.               Functional Status     Row Name 07/09/22 1739       Functional Status, IADL    Medications independent    Meal Preparation independent    Housekeeping independent    Laundry independent    Shopping independent       Mental Status    General Appearance WDL WDL       Mental Status Summary    Recent Changes in Mental Status/Cognitive Functioning no changes               Psychosocial     Row Name 07/09/22 1740       Values/Beliefs    Spiritual, Cultural Beliefs, Yarsanism Practices, Values that Affect Care no       Behavior WDL    Behavior WDL WDL       Emotion Mood WDL    Emotion/Mood/Affect WDL WDL       Speech WDL    Speech WDL WDL       Perceptual State WDL    Perceptual State WDL WDL       Thought Process WDL    Thought Process WDL WDL       Intellectual Performance WDL    Intellectual Performance WDL WDL       Coping/Stress    Major Change/Loss/Stressor none    Patient Personal Strengths able to adapt;expressive of emotions;expressive of needs    Sources of Support significant other    Reaction to Health Status accepting;adjusting    Understanding of Condition and Treatment adequate understanding of medical condition;adequate understanding of treatment       Developmental Stage (Eriksson's)    Developmental Stage Stage 6 (18-35 years/Young Adulthood) Intimacy vs. Isolation               Abuse/Neglect    No documentation.                Legal    No documentation.                Substance Abuse    No documentation.                Patient Forms    No documentation.                   Bo Fox, LACI

## 2022-07-10 VITALS
HEART RATE: 75 BPM | TEMPERATURE: 98.2 F | OXYGEN SATURATION: 100 % | DIASTOLIC BLOOD PRESSURE: 50 MMHG | SYSTOLIC BLOOD PRESSURE: 109 MMHG | RESPIRATION RATE: 16 BRPM

## 2022-07-10 LAB
ANION GAP SERPL CALCULATED.3IONS-SCNC: 8 MMOL/L (ref 5–15)
BUN SERPL-MCNC: 7 MG/DL (ref 6–20)
BUN/CREAT SERPL: 14 (ref 7–25)
CALCIUM SPEC-SCNC: 7.7 MG/DL (ref 8.6–10.5)
CHLORIDE SERPL-SCNC: 110 MMOL/L (ref 98–107)
CO2 SERPL-SCNC: 19 MMOL/L (ref 22–29)
CREAT SERPL-MCNC: 0.5 MG/DL (ref 0.57–1)
DEPRECATED RDW RBC AUTO: 36.2 FL (ref 37–54)
EGFRCR SERPLBLD CKD-EPI 2021: 136.2 ML/MIN/1.73
ERYTHROCYTE [DISTWIDTH] IN BLOOD BY AUTOMATED COUNT: 11.6 % (ref 12.3–15.4)
GLUCOSE SERPL-MCNC: 143 MG/DL (ref 65–99)
HCT VFR BLD AUTO: 30.3 % (ref 34–46.6)
HGB BLD-MCNC: 10.5 G/DL (ref 12–15.9)
MCH RBC QN AUTO: 30.8 PG (ref 26.6–33)
MCHC RBC AUTO-ENTMCNC: 34.7 G/DL (ref 31.5–35.7)
MCV RBC AUTO: 88.9 FL (ref 79–97)
PLATELET # BLD AUTO: 179 10*3/MM3 (ref 140–450)
PMV BLD AUTO: 10.7 FL (ref 6–12)
POTASSIUM SERPL-SCNC: 3.5 MMOL/L (ref 3.5–5.2)
RBC # BLD AUTO: 3.41 10*6/MM3 (ref 3.77–5.28)
SODIUM SERPL-SCNC: 137 MMOL/L (ref 136–145)
WBC NRBC COR # BLD: 6.16 10*3/MM3 (ref 3.4–10.8)

## 2022-07-10 PROCEDURE — 85027 COMPLETE CBC AUTOMATED: CPT | Performed by: PHYSICIAN ASSISTANT

## 2022-07-10 PROCEDURE — 96361 HYDRATE IV INFUSION ADD-ON: CPT

## 2022-07-10 PROCEDURE — 63710000001 ONDANSETRON ODT 4 MG TABLET DISPERSIBLE

## 2022-07-10 PROCEDURE — 80048 BASIC METABOLIC PNL TOTAL CA: CPT | Performed by: PHYSICIAN ASSISTANT

## 2022-07-10 PROCEDURE — G0378 HOSPITAL OBSERVATION PER HR: HCPCS

## 2022-07-10 RX ORDER — ONDANSETRON 4 MG/1
8 TABLET, ORALLY DISINTEGRATING ORAL EVERY 6 HOURS PRN
Status: DISCONTINUED | OUTPATIENT
Start: 2022-07-10 | End: 2022-07-10 | Stop reason: HOSPADM

## 2022-07-10 RX ADMIN — ONDANSETRON 8 MG: 4 TABLET, ORALLY DISINTEGRATING ORAL at 08:25

## 2022-07-10 RX ADMIN — DEXTROSE AND SODIUM CHLORIDE 250 ML/HR: 5; 900 INJECTION, SOLUTION INTRAVENOUS at 03:11

## 2022-07-10 NOTE — PLAN OF CARE
Goal Outcome Evaluation:  Pt given discharge paperwork and follow up instructions. Pt ambulated off the unit at time of discharge

## 2022-07-10 NOTE — PLAN OF CARE
Goal Outcome Evaluation: PT complained of nausea early in the shift that resolved shortly thereafter.  PT also stated that her headache has resolved.  She was able to tolerate chicken broth.  Vital signs have been stable.  Will continue to monitor.

## 2022-07-10 NOTE — PROGRESS NOTES
[facility]     Progress Note    Patient Name: Odette Gar  : 2000  MRN: [id number]  Primary Care Physician:  Provider, No Known  Date of admission: 2022    Subjective   Subjective     Chief Complaint: Nausea and vomiting  History of Present Illness  Patient Reports nauseated this morning no vomiting since yesterday.  Zofran ODT ordered for patient.    Review of Systems    Objective   Objective     Vitals:   Temp:  [97.5 °F (36.4 °C)-98.4 °F (36.9 °C)] 98.2 °F (36.8 °C)  Heart Rate:  [71-86] 84  Resp:  [16-18] 16  BP: (104-121)/(46-82) 104/59    Physical Exam     Result Review    Result Review:  I have personally reviewed the results from the time of this admission to 7/10/2022 08:28 EDT and agree with these findings:  [x]  Laboratory list / accordion  []  Microbiology  []  Radiology  []  EKG/Telemetry   []  Cardiology/Vascular   []  Pathology  []  Old records  []  Other:  Most notable findings include: BUN 7, creatinine 0.50      Assessment & Plan   Assessment / Plan     Brief Patient Summary:  Odette Gar is a 22 y.o. female who was admitted to the observation unit for further evaluation of her nausea and vomiting.  Active Hospital Problems:  Active Hospital Problems    Diagnosis    • Hyperemesis gravidarum      Plan:   Hyperemesis gravidarum, first trimester  -IV fluids  -Clear liquid diet  -Zofran as needed  -Start Diclegis tonight  -Consult OB/GYN  -Monitor          Patient request to go home prior to be seen her.  The nurse spoke with Dr. Ambriz who is agreeable for the patient to go home and she is to follow-up in the office tomorrow.    DVT prophylaxis:  Mechanical DVT prophylaxis orders are present.    CODE STATUS:    Level Of Support Discussed With: Patient  Code Status (Patient has no pulse and is not breathing): CPR (Attempt to Resuscitate)  Medical Interventions (Patient has pulse or is breathing): Full Support    Disposition:  I expect patient to be discharged home      This will  also serve as a discharge summary.    Radha Isaacs, APRN

## 2022-07-10 NOTE — NURSING NOTE
RN spoke with Dr. Ambriz and given the option to go and follow up in the office or wait for MD to come see her. Pt reports she will follow up in the office. MD aware.

## 2022-07-10 NOTE — PROGRESS NOTES
[facility] ATTESTATION NOTE    The GETEA and I have discussed this patient's history, physical exam, and treatment plan.  I have reviewed the documentation and personally had a face to face interaction with the patient. I affirm the documentation and agree with the treatment and plan.  The attached note describes my personal findings.      I provided a substantive portion of the care of this patient. I personally performed the physical exam, in its entirety.    Odette Gar is a 22 y.o. female who presented to the emergency department today complaining of persistent nausea and vomiting.  She reports that she has a referral started after pregnancy.  She was using Zofran at home with control of her nausea.  She reports that her OB doctor switched her to Phenergan and since then she has had persistent nausea.  She was admitted to the observation unit for further management.  She reports that since she has received Zofran here her nausea has resolved.  She has no abdominal pain.  She is asking to eat.  My plan is that if she can eat and tolerate food for several hours she can go home if she wishes.  If she has any doubts she can stay and be evaluated by OB in the morning.      On exam:  GENERAL: Awake, alert, no acute distress  SKIN: Warm, dry  HENT: Normocephalic, atraumatic  EYES: no scleral icterus  CV: regular rhythm, regular rate  RESPIRATORY: normal effort, lungs clear  ABDOMEN: soft, nontender, nondistended  MUSCULOSKELETAL: no deformity  NEURO: alert, moves all extremities, follows commands        Labs  Recent Results (from the past 24 hour(s))   Comprehensive Metabolic Panel    Collection Time: 07/09/22  2:15 PM    Specimen: Blood   Result Value Ref Range    Glucose 91 65 - 99 mg/dL    BUN 10 6 - 20 mg/dL    Creatinine 0.62 0.57 - 1.00 mg/dL    Sodium 138 136 - 145 mmol/L    Potassium 4.3 3.5 - 5.2 mmol/L    Chloride 102 98 - 107 mmol/L    CO2 23.0 22.0 - 29.0 mmol/L    Calcium 9.7 8.6 - 10.5 mg/dL    Total Protein  7.3 6.0 - 8.5 g/dL    Albumin 4.70 3.50 - 5.20 g/dL    ALT (SGPT) 10 1 - 33 U/L    AST (SGOT) 11 1 - 32 U/L    Alkaline Phosphatase 53 39 - 117 U/L    Total Bilirubin 0.5 0.0 - 1.2 mg/dL    Globulin 2.6 gm/dL    A/G Ratio 1.8 g/dL    BUN/Creatinine Ratio 16.1 7.0 - 25.0    Anion Gap 13.0 5.0 - 15.0 mmol/L    eGFR 129.3 >60.0 mL/min/1.73   Lipase    Collection Time: 07/09/22  2:15 PM    Specimen: Blood   Result Value Ref Range    Lipase 17 13 - 60 U/L   hCG, Quantitative, Pregnancy    Collection Time: 07/09/22  2:15 PM    Specimen: Blood   Result Value Ref Range    HCG Quantitative 65,695.00 mIU/mL   Green Top (Gel)    Collection Time: 07/09/22  2:15 PM   Result Value Ref Range    Extra Tube Hold for add-ons.    Lavender Top    Collection Time: 07/09/22  2:15 PM   Result Value Ref Range    Extra Tube hold for add-on    Light Blue Top    Collection Time: 07/09/22  2:15 PM   Result Value Ref Range    Extra Tube Hold for add-ons.    CBC Auto Differential    Collection Time: 07/09/22  2:15 PM    Specimen: Blood   Result Value Ref Range    WBC 9.24 3.40 - 10.80 10*3/mm3    RBC 4.35 3.77 - 5.28 10*6/mm3    Hemoglobin 13.0 12.0 - 15.9 g/dL    Hematocrit 38.6 34.0 - 46.6 %    MCV 88.7 79.0 - 97.0 fL    MCH 29.9 26.6 - 33.0 pg    MCHC 33.7 31.5 - 35.7 g/dL    RDW 11.6 (L) 12.3 - 15.4 %    RDW-SD 37.0 37.0 - 54.0 fl    MPV 10.6 6.0 - 12.0 fL    Platelets 231 140 - 450 10*3/mm3    Neutrophil % 76.9 (H) 42.7 - 76.0 %    Lymphocyte % 14.6 (L) 19.6 - 45.3 %    Monocyte % 7.7 5.0 - 12.0 %    Eosinophil % 0.1 (L) 0.3 - 6.2 %    Basophil % 0.5 0.0 - 1.5 %    Immature Grans % 0.2 0.0 - 0.5 %    Neutrophils, Absolute 7.10 (H) 1.70 - 7.00 10*3/mm3    Lymphocytes, Absolute 1.35 0.70 - 3.10 10*3/mm3    Monocytes, Absolute 0.71 0.10 - 0.90 10*3/mm3    Eosinophils, Absolute 0.01 0.00 - 0.40 10*3/mm3    Basophils, Absolute 0.05 0.00 - 0.20 10*3/mm3    Immature Grans, Absolute 0.02 0.00 - 0.05 10*3/mm3    nRBC 0.0 0.0 - 0.2 /100 WBC    Urinalysis With Microscopic If Indicated (No Culture) - Urine, Clean Catch    Collection Time: 07/09/22  4:08 PM    Specimen: Urine, Clean Catch   Result Value Ref Range    Color, UA Yellow Yellow, Straw    Appearance, UA Clear Clear    pH, UA 6.0 5.0 - 8.0    Specific Gravity, UA >=1.030 1.005 - 1.030    Glucose, UA Negative Negative    Ketones, UA 80 mg/dL (3+) (A) Negative    Bilirubin, UA Negative Negative    Blood, UA Negative Negative    Protein, UA Trace (A) Negative    Leuk Esterase, UA Negative Negative    Nitrite, UA Negative Negative    Urobilinogen, UA 1.0 E.U./dL 0.2 - 1.0 E.U./dL   COVID-19, RENAN IN-HOUSE CEPHEID/ELLA NP SWAB IN TRANSPORT MEDIA 8-12 HR TAT - Swab, Nasopharynx    Collection Time: 07/09/22  5:46 PM    Specimen: Nasopharynx; Swab   Result Value Ref Range    COVID19 Not Detected Not Detected - Ref. Range       Radiology  No Radiology Exams Resulted Within Past 24 Hours            PPE: The patient and I wore a mask throughout the entire patient encounter.

## 2022-07-11 ENCOUNTER — TELEPHONE (OUTPATIENT)
Dept: OBSTETRICS AND GYNECOLOGY | Facility: CLINIC | Age: 22
End: 2022-07-11

## 2022-07-11 RX ORDER — ONDANSETRON 4 MG/1
4 TABLET, FILM COATED ORAL DAILY PRN
Qty: 30 TABLET | Refills: 1 | Status: SHIPPED | OUTPATIENT
Start: 2022-07-11 | End: 2022-07-12 | Stop reason: SDUPTHER

## 2022-07-11 NOTE — TELEPHONE ENCOUNTER
Hi Dr. Slater,  Pt called she was in the ER over the weekend for dehydration. She said that while there she had Zofran and that worked well for her. Hospital was unable to give prescription for this and advised her to call to request it from you.     Confirmed pharmacy [id number] - [place], [place] - [address] AT [facility] [address] - [phone] [facility] [phone] FX    Thanks,   Tessy

## 2022-07-11 NOTE — TELEPHONE ENCOUNTER
Rx sent with one refill. Please see if she can see me or Julia Schwarz as follow up within the next week. Thanks!

## 2022-07-12 ENCOUNTER — ROUTINE PRENATAL (OUTPATIENT)
Dept: OBSTETRICS AND GYNECOLOGY | Facility: CLINIC | Age: 22
End: 2022-07-12

## 2022-07-12 VITALS — SYSTOLIC BLOOD PRESSURE: 115 MMHG | WEIGHT: 114.6 LBS | DIASTOLIC BLOOD PRESSURE: 71 MMHG | BODY MASS INDEX: 22.38 KG/M2

## 2022-07-12 DIAGNOSIS — Z34.00 SUPERVISION OF NORMAL FIRST PREGNANCY, ANTEPARTUM: Primary | ICD-10-CM

## 2022-07-12 DIAGNOSIS — O21.0 MILD HYPEREMESIS GRAVIDARUM: ICD-10-CM

## 2022-07-12 LAB
BILIRUB BLD-MCNC: ABNORMAL MG/DL
CLARITY, POC: ABNORMAL
CLINICAL INFO: NORMAL
COLOR UR: YELLOW
ETHNIC BACKGROUND STATED: NORMAL
GENE MUT TESTED BLD/T: NORMAL
GENETIC COUNSELOR:: NORMAL
GLUCOSE UR STRIP-MCNC: NEGATIVE MG/DL
KETONES UR QL: ABNORMAL
LAB DIRECTOR NAME PROVIDER: NORMAL
LEUKOCYTE EST, POC: ABNORMAL
MOL DX INTERP BLD/T QL: NORMAL
NITRITE UR-MCNC: NEGATIVE MG/ML
PH UR: 6.5 [PH] (ref 5–8)
PROT UR STRIP-MCNC: ABNORMAL MG/DL
RBC # UR STRIP: NEGATIVE /UL
REASON FOR REFERRAL (NARRATIVE): NORMAL
REF LAB TEST METHOD: NORMAL
SERVICE CMNT 02-IMP: NORMAL
SP GR UR: 1.03 (ref 1–1.03)
SPECIMEN SOURCE: NORMAL
TEST PERFORMANCE INFO SPEC: NORMAL
UROBILINOGEN UR QL: ABNORMAL

## 2022-07-12 PROCEDURE — 99213 OFFICE O/P EST LOW 20 MIN: CPT | Performed by: OBSTETRICS & GYNECOLOGY

## 2022-07-12 RX ORDER — ONDANSETRON 4 MG/1
4 TABLET, FILM COATED ORAL DAILY PRN
Qty: 30 TABLET | Refills: 1 | Status: SHIPPED | OUTPATIENT
Start: 2022-07-12 | End: 2022-08-24 | Stop reason: SDUPTHER

## 2022-07-12 RX ORDER — DOXYLAMINE SUCCINATE AND PYRIDOXINE HYDROCHLORIDE, DELAYED RELEASE TABLETS 10 MG/10 MG 10; 10 MG/1; MG/1
TABLET, DELAYED RELEASE ORAL
Qty: 100 TABLET | Refills: 0 | Status: SHIPPED | OUTPATIENT
Start: 2022-07-12 | End: 2022-12-21

## 2022-07-12 NOTE — PROGRESS NOTES
Chief Complaint   Patient presents with   • Routine Prenatal Visit      Odette Gar is a 22 y.o.  at 7w5d  here for routine OB visit  No bleeding   Some cramping from time to time  Reports she had low appetite yesterday until last night and was able to eat mashed potatoes, corn, peas, and a little pork loin  She had emesis this AM at 7.  She has had some gatorade today  She usually feels better at night  Feels that she can stay hydrated. Has not yet picked up zofran -would prefer it sent to [facility].  Would like to start Diclegis, too. Changing insurance to passport    /71   Wt 52 kg (114 lb 9.6 oz)   LMP 2022   BMI 22.38 kg/m²    Gen: NAD, well appearing  Abd: nontender  See OB Flowsheet    ASSESSMENT/PLAN:  (Z34.00) Supervision of normal first pregnancy, antepartum - Plan: POC Urinalysis Dipstick    (O21.0) Mild hyperemesis gravidarum    Rx for Zofran, Diclegis  Will do PA for diclegis - she has tried behavioral modifications and phenergan without issues.   Reviewed first trimester bleeding/pain precautions, and indications for sooner follow up.     Ketones noted on UA - recommend increasing hydration.  Reviewed if cannot tolerate PO for 8 hours or with dark urine, decreased urine output would recommend more immediate evaluation. Reviewed weight loss precautions.   Return in about 1 week (around 2022) for OB US, OB visit.

## 2022-07-13 ENCOUNTER — TELEPHONE (OUTPATIENT)
Dept: OBSTETRICS AND GYNECOLOGY | Facility: CLINIC | Age: 22
End: 2022-07-13

## 2022-07-13 NOTE — TELEPHONE ENCOUNTER
----- Message from Gladis Slater MD sent at 7/12/2022  1:06 PM EDT -----  Please let patient know that testing for the common mutations associated with cystic fibrosis, spinal muscular atrophy (SMA), and Fragile X were normal (negative).

## 2022-07-19 ENCOUNTER — ROUTINE PRENATAL (OUTPATIENT)
Dept: OBSTETRICS AND GYNECOLOGY | Facility: CLINIC | Age: 22
End: 2022-07-19

## 2022-07-19 VITALS — BODY MASS INDEX: 22.23 KG/M2 | WEIGHT: 113.8 LBS | DIASTOLIC BLOOD PRESSURE: 63 MMHG | SYSTOLIC BLOOD PRESSURE: 105 MMHG

## 2022-07-19 DIAGNOSIS — Z34.00 SUPERVISION OF NORMAL FIRST PREGNANCY, ANTEPARTUM: ICD-10-CM

## 2022-07-19 DIAGNOSIS — O21.0 MILD HYPEREMESIS GRAVIDARUM: Primary | ICD-10-CM

## 2022-07-19 LAB
GLUCOSE UR STRIP-MCNC: NEGATIVE MG/DL
PROT UR STRIP-MCNC: ABNORMAL MG/DL

## 2022-07-19 PROCEDURE — 99213 OFFICE O/P EST LOW 20 MIN: CPT | Performed by: OBSTETRICS & GYNECOLOGY

## 2022-07-19 NOTE — PROGRESS NOTES
Chief Complaint   Patient presents with   • Routine Prenatal Visit      Odette Gar is a 22 y.o.  at 8w5d  here for routine OB visit  No bleeding or LOF.   Nausea and vomiting much better over last 4 days, able to stay hydrated    /63   Wt 51.6 kg (113 lb 12.8 oz)   LMP 2022   BMI 22.23 kg/m²    Gen: NAD, well appearing  Abd: nontender  UA no ketones  See OB Flowsheet    ASSESSMENT/PLAN:  (O21.0) Mild hyperemesis gravidarum    (Z34.00) Supervision of normal first pregnancy, antepartum    Continue medication PRN  Reviewed US and EDC 23  She is interested in cell free DNA. Aware of limitations of testing, possibility of need for additional testing (such as amniocentesis), possibility of out of pocket expense, possibility of false positive/false negative results.     Return for Next scheduled follow up.

## 2022-07-22 ENCOUNTER — APPOINTMENT (OUTPATIENT)
Dept: WOMENS IMAGING | Facility: HOSPITAL | Age: 22
End: 2022-07-22

## 2022-07-22 PROCEDURE — 76641 ULTRASOUND BREAST COMPLETE: CPT | Performed by: RADIOLOGY

## 2022-07-25 ENCOUNTER — TELEPHONE (OUTPATIENT)
Dept: OBSTETRICS AND GYNECOLOGY | Facility: CLINIC | Age: 22
End: 2022-07-25

## 2022-07-25 DIAGNOSIS — R92.8 ABNORMAL ULTRASOUND OF BREAST: Primary | ICD-10-CM

## 2022-07-25 DIAGNOSIS — N63.10 MASS OF RIGHT BREAST, UNSPECIFIED QUADRANT: ICD-10-CM

## 2022-07-25 DIAGNOSIS — N63.0 BREAST MASS IN FEMALE: ICD-10-CM

## 2022-07-25 NOTE — TELEPHONE ENCOUNTER
Please call patient to discuss abnormal findings from her recent Breast US.  She is needing a biopsy.  Patient is not aware.  Referrals have been entered and report in Epic.   SR

## 2022-07-29 ENCOUNTER — ROUTINE PRENATAL (OUTPATIENT)
Dept: OBSTETRICS AND GYNECOLOGY | Facility: CLINIC | Age: 22
End: 2022-07-29

## 2022-07-29 VITALS — SYSTOLIC BLOOD PRESSURE: 98 MMHG | BODY MASS INDEX: 22.69 KG/M2 | DIASTOLIC BLOOD PRESSURE: 59 MMHG | WEIGHT: 116.2 LBS

## 2022-07-29 DIAGNOSIS — O21.9 NAUSEA AND VOMITING DURING PREGNANCY: ICD-10-CM

## 2022-07-29 DIAGNOSIS — Z34.00 SUPERVISION OF NORMAL FIRST PREGNANCY, ANTEPARTUM: Primary | ICD-10-CM

## 2022-07-29 LAB
GLUCOSE UR STRIP-MCNC: NEGATIVE MG/DL
PROT UR STRIP-MCNC: NEGATIVE MG/DL

## 2022-07-29 PROCEDURE — 99213 OFFICE O/P EST LOW 20 MIN: CPT | Performed by: OBSTETRICS & GYNECOLOGY

## 2022-07-29 NOTE — PROGRESS NOTES
Chief Complaint   Patient presents with   • Routine Prenatal Visit      Odette Gar is a 22 y.o.  at 10w1d  here for routine OB visit  No bleeding or LOF.     BP 98/59   Wt 52.7 kg (116 lb 3.2 oz)   LMP 2022   BMI 22.69 kg/m²    Gen: NAD, well appearing  Abd: nontender  See OB Flowsheet    ASSESSMENT/PLAN:  (Z34.00) Supervision of normal first pregnancy, antepartum - Plan: [id number] PLUS Core+SCA - Blood,    (O21.9) Nausea and vomiting during pregnancy    She is interested in cell free DNA. Aware of limitations of testing, possibility of need for additional testing (such as amniocentesis), possibility of out of pocket expense, possibility of false positive/false negative results.     Nausea improved, does not need refill on medications  Reviewed seafood in pregnancy, avoidance of undercooked/uncooked seafood and meat that could be high in mercury.   Reviewed first trimester bleeding/pain precautions, and indications for sooner follow up.     Return in about 4 weeks (around 2022).

## 2022-08-03 ENCOUNTER — TELEPHONE (OUTPATIENT)
Dept: OBSTETRICS AND GYNECOLOGY | Facility: CLINIC | Age: 22
End: 2022-08-03

## 2022-08-03 ENCOUNTER — APPOINTMENT (OUTPATIENT)
Dept: WOMENS IMAGING | Facility: HOSPITAL | Age: 22
End: 2022-08-03

## 2022-08-03 LAB
CFDNA.FET/CFDNA.TOTAL SFR FETUS: NORMAL %
CITATION REF LAB TEST: NORMAL
FET 13+18+21+X+Y ANEUP PLAS.CFDNA: NEGATIVE
FET CHR 21 TS PLAS.CFDNA QL: NEGATIVE
FET MS X RISK WBC.DNA+CFDNA QL: NOT DETECTED
FET SEX PLAS.CFDNA DOSAGE CFDNA: NORMAL
FET TS 13 RISK PLAS.CFDNA QL: NEGATIVE
FET TS 18 RISK WBC.DNA+CFDNA QL: NEGATIVE
FET X + Y ANEUP RISK PLAS.CFDNA SEQ-IMP: NOT DETECTED
GA EST FROM CONCEPTION DATE: NORMAL D
GESTATIONAL AGE > 9:: YES
LAB DIRECTOR NAME PROVIDER: NORMAL
LAB DIRECTOR NAME PROVIDER: NORMAL
LABORATORY COMMENT REPORT: NORMAL
LIMITATIONS OF THE TEST: NORMAL
NEGATIVE PREDICTIVE VALUE: NORMAL
NOTE: NORMAL
PERFORMANCE CHARACTERISTICS: NORMAL
POSITIVE PREDICTIVE VALUE: NORMAL
REF LAB TEST METHOD: NORMAL
TEST PERFORMANCE INFO SPEC: NORMAL

## 2022-08-03 PROCEDURE — 19083 BX BREAST 1ST LESION US IMAG: CPT | Performed by: RADIOLOGY

## 2022-08-03 PROCEDURE — A4648 IMPLANTABLE TISSUE MARKER: HCPCS | Performed by: RADIOLOGY

## 2022-08-03 NOTE — TELEPHONE ENCOUNTER
----- Message from Gladis Slater MD sent at 8/3/2022 10:23 AM EDT -----  Please let patient know that aneuploidy screening was negative for trisomy 21/18/13.  If she wishes to know sex, testing shows consistent with female.

## 2022-08-04 ENCOUNTER — TELEPHONE (OUTPATIENT)
Dept: OBSTETRICS AND GYNECOLOGY | Facility: CLINIC | Age: 22
End: 2022-08-04

## 2022-08-04 DIAGNOSIS — R92.8 ABNORMAL ULTRASOUND OF BREAST: ICD-10-CM

## 2022-08-04 DIAGNOSIS — N63.10 MASS OF RIGHT BREAST, UNSPECIFIED QUADRANT: ICD-10-CM

## 2022-08-04 NOTE — TELEPHONE ENCOUNTER
Called patient back. Reports she has had nightmares before and they are worse now. Recommend consideration of therapy. She will consider.  She had breast biopsy and got a call that it was benign. Recommend calling back with worsening or other questions.

## 2022-08-04 NOTE — TELEPHONE ENCOUNTER
Pt is calling and asking to relay to provider that she has been having really bad nightmares and cannot sleep since she got pregnant. Pt states this is giving her terrible anxiety and would like to speak with provider, requesting phone call when available.     Thank you,   Gaby

## 2022-08-15 ENCOUNTER — TELEPHONE (OUTPATIENT)
Dept: OBSTETRICS AND GYNECOLOGY | Facility: CLINIC | Age: 22
End: 2022-08-15

## 2022-08-15 ENCOUNTER — CLINICAL SUPPORT (OUTPATIENT)
Dept: OBSTETRICS AND GYNECOLOGY | Facility: CLINIC | Age: 22
End: 2022-08-15

## 2022-08-15 DIAGNOSIS — R30.0 DYSURIA: Primary | ICD-10-CM

## 2022-08-15 LAB
BILIRUB BLD-MCNC: NEGATIVE MG/DL
CLARITY, POC: ABNORMAL
COLOR UR: ABNORMAL
GLUCOSE UR STRIP-MCNC: NEGATIVE MG/DL
KETONES UR QL: NEGATIVE
LEUKOCYTE EST, POC: ABNORMAL
NITRITE UR-MCNC: POSITIVE MG/ML
PH UR: 6 [PH] (ref 5–8)
PROT UR STRIP-MCNC: NEGATIVE MG/DL
RBC # UR STRIP: ABNORMAL /UL
SP GR UR: 1.03 (ref 1–1.03)
UROBILINOGEN UR QL: NORMAL

## 2022-08-15 RX ORDER — NITROFURANTOIN 25; 75 MG/1; MG/1
100 CAPSULE ORAL 2 TIMES DAILY
Qty: 14 CAPSULE | Refills: 0 | Status: SHIPPED | OUTPATIENT
Start: 2022-08-15 | End: 2022-08-22

## 2022-08-15 NOTE — TELEPHONE ENCOUNTER
Tessy,     I sent in antibiotic. Needs to try and come by first and give sample at the office to send off - that helps confirm UTI, verify treating with correct antibiotic.  Otherwise drink a lot of water to help flush out system.     Thanks,   Dr. Conley

## 2022-08-15 NOTE — TELEPHONE ENCOUNTER
----- Message from Sonido Schaefer CMA sent at 8/15/2022  1:45 PM EDT -----  Dr Slater is out of the office. This patient came in for UA and culture. Wanted to let you know.

## 2022-08-15 NOTE — TELEPHONE ENCOUNTER
----- Message from Sergey Conley MD sent at 8/15/2022  4:31 PM EDT -----  Macrobid sent see phone notes.

## 2022-08-15 NOTE — TELEPHONE ENCOUNTER
Pt requesting prescription for UTI. Is having burning, discomfort and needing to pee frequently.     Pharmacy   [id number] - [place], [place] - [address] [facility] - [phone]  - [phone]    [address] [place] [id number]     ThanksTessy

## 2022-08-15 NOTE — TELEPHONE ENCOUNTER
Caller: Odette Gar    Relationship: Self    Best call back number: [phone]    What medication are you requesting: UTI     What are your current symptoms: BURNS REALLY BAD, UNCOMFORTABLE , HAS TO BE 24/7. PT IS PREGNANT    How long have you been experiencing symptoms: 3 DAYS    Have you had these symptoms before:    [x] Yes  [] No    Have you been treated for these symptoms before:   [x] Yes  [] No    If a prescription is needed, what is your preferred pharmacy and phone number:  [facility] [address] [facility] [phone]    Additional notes: PT IS AVAILABLE AT ANYTIME.

## 2022-08-17 NOTE — PROGRESS NOTES
Dr. Bryon Fontenot patient. UTI confirmed. We sent macrobid with culture. Awaiting sensitivities still. Please let her know. Thanks, Dr. Conley

## 2022-08-18 ENCOUNTER — TELEPHONE (OUTPATIENT)
Dept: OBSTETRICS AND GYNECOLOGY | Facility: CLINIC | Age: 22
End: 2022-08-18

## 2022-08-18 LAB
BACTERIA UR CULT: ABNORMAL
BACTERIA UR CULT: ABNORMAL
OTHER ANTIBIOTIC SUSC ISLT: ABNORMAL

## 2022-08-18 NOTE — TELEPHONE ENCOUNTER
----- Message from Sergey Conley MD sent at 8/18/2022  4:56 PM EDT -----  Sonido, Her UTI is sensitive to macrobid. Encourage her to finish course of antibiotic. Thanks, Dr. Conley

## 2022-08-24 ENCOUNTER — ROUTINE PRENATAL (OUTPATIENT)
Dept: OBSTETRICS AND GYNECOLOGY | Facility: CLINIC | Age: 22
End: 2022-08-24

## 2022-08-24 VITALS — WEIGHT: 114.2 LBS | DIASTOLIC BLOOD PRESSURE: 76 MMHG | BODY MASS INDEX: 22.3 KG/M2 | SYSTOLIC BLOOD PRESSURE: 115 MMHG

## 2022-08-24 DIAGNOSIS — Z34.00 SUPERVISION OF NORMAL FIRST PREGNANCY, ANTEPARTUM: Primary | ICD-10-CM

## 2022-08-24 DIAGNOSIS — D24.9 FIBROADENOMA OF BREAST, UNSPECIFIED LATERALITY: ICD-10-CM

## 2022-08-24 DIAGNOSIS — O21.0 HYPEREMESIS GRAVIDARUM: ICD-10-CM

## 2022-08-24 PROCEDURE — 99213 OFFICE O/P EST LOW 20 MIN: CPT | Performed by: OBSTETRICS & GYNECOLOGY

## 2022-08-24 RX ORDER — ONDANSETRON 4 MG/1
4 TABLET, FILM COATED ORAL DAILY PRN
Qty: 30 TABLET | Refills: 1 | Status: SHIPPED | OUTPATIENT
Start: 2022-08-24 | End: 2022-12-21

## 2022-08-24 NOTE — PROGRESS NOTES
Chief Complaint   Patient presents with   • Routine Prenatal Visit      Odette Gar is a 22 y.o.  at 13w6d  here for routine OB visit  No bleeding or LOF.   Reports today her nausea has been fine without medication, yesterday required zofran. 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  Anatomy US around 20 weeks  Reviewed common second trimester symptoms.  Reviewed precautions for signs of  labor, anticipated fetal movements.     Return in about 4 weeks (around 2022).

## 2022-08-30 ENCOUNTER — TELEPHONE (OUTPATIENT)
Dept: OBSTETRICS AND GYNECOLOGY | Facility: CLINIC | Age: 22
End: 2022-08-30

## 2022-08-30 ENCOUNTER — HOSPITAL ENCOUNTER (EMERGENCY)
Facility: HOSPITAL | Age: 22
Discharge: HOME | End: 2022-08-30
Attending: EMERGENCY MEDICINE | Admitting: EMERGENCY MEDICINE

## 2022-08-30 VITALS
RESPIRATION RATE: 14 BRPM | HEIGHT: 60 IN | TEMPERATURE: 98.4 F | DIASTOLIC BLOOD PRESSURE: 60 MMHG | BODY MASS INDEX: 22.38 KG/M2 | OXYGEN SATURATION: 99 % | SYSTOLIC BLOOD PRESSURE: 102 MMHG | HEART RATE: 99 BPM | WEIGHT: 114 LBS

## 2022-08-30 DIAGNOSIS — Z3A.14 14 WEEKS GESTATION OF PREGNANCY: ICD-10-CM

## 2022-08-30 DIAGNOSIS — U07.1 COVID-19: Primary | ICD-10-CM

## 2022-08-30 DIAGNOSIS — D64.9 ANEMIA, UNSPECIFIED TYPE: ICD-10-CM

## 2022-08-30 LAB
ALBUMIN SERPL-MCNC: 3.8 G/DL (ref 3.5–5.2)
ALBUMIN/GLOB SERPL: 1.8 G/DL
ALP SERPL-CCNC: 40 U/L (ref 39–117)
ALT SERPL W P-5'-P-CCNC: 11 U/L (ref 1–33)
ANION GAP SERPL CALCULATED.3IONS-SCNC: 9.9 MMOL/L (ref 5–15)
AST SERPL-CCNC: 13 U/L (ref 1–32)
B PARAPERT DNA SPEC QL NAA+PROBE: NOT DETECTED
B PERT DNA SPEC QL NAA+PROBE: NOT DETECTED
BACTERIA UR QL AUTO: ABNORMAL /HPF
BASOPHILS # BLD AUTO: 0.02 10*3/MM3 (ref 0–0.2)
BASOPHILS NFR BLD AUTO: 0.4 % (ref 0–1.5)
BILIRUB SERPL-MCNC: <0.2 MG/DL (ref 0–1.2)
BILIRUB UR QL STRIP: NEGATIVE
BUN SERPL-MCNC: 6 MG/DL (ref 6–20)
BUN/CREAT SERPL: 11.5 (ref 7–25)
C PNEUM DNA NPH QL NAA+NON-PROBE: NOT DETECTED
CALCIUM SPEC-SCNC: 8.6 MG/DL (ref 8.6–10.5)
CHLORIDE SERPL-SCNC: 100 MMOL/L (ref 98–107)
CLARITY UR: ABNORMAL
CO2 SERPL-SCNC: 22.1 MMOL/L (ref 22–29)
COLOR UR: YELLOW
CREAT SERPL-MCNC: 0.52 MG/DL (ref 0.57–1)
DEPRECATED RDW RBC AUTO: 41.2 FL (ref 37–54)
EGFRCR SERPLBLD CKD-EPI 2021: 134.9 ML/MIN/1.73
EOSINOPHIL # BLD AUTO: 0 10*3/MM3 (ref 0–0.4)
EOSINOPHIL NFR BLD AUTO: 0 % (ref 0.3–6.2)
ERYTHROCYTE [DISTWIDTH] IN BLOOD BY AUTOMATED COUNT: 12.7 % (ref 12.3–15.4)
FLUAV SUBTYP SPEC NAA+PROBE: NOT DETECTED
FLUBV RNA ISLT QL NAA+PROBE: NOT DETECTED
GLOBULIN UR ELPH-MCNC: 2.1 GM/DL
GLUCOSE SERPL-MCNC: 90 MG/DL (ref 65–99)
GLUCOSE UR STRIP-MCNC: NEGATIVE MG/DL
HADV DNA SPEC NAA+PROBE: NOT DETECTED
HCOV 229E RNA SPEC QL NAA+PROBE: NOT DETECTED
HCOV HKU1 RNA SPEC QL NAA+PROBE: NOT DETECTED
HCOV NL63 RNA SPEC QL NAA+PROBE: NOT DETECTED
HCOV OC43 RNA SPEC QL NAA+PROBE: NOT DETECTED
HCT VFR BLD AUTO: 27.8 % (ref 34–46.6)
HGB BLD-MCNC: 9.5 G/DL (ref 12–15.9)
HGB UR QL STRIP.AUTO: NEGATIVE
HMPV RNA NPH QL NAA+NON-PROBE: NOT DETECTED
HOLD SPECIMEN: NORMAL
HOLD SPECIMEN: NORMAL
HPIV1 RNA ISLT QL NAA+PROBE: NOT DETECTED
HPIV2 RNA SPEC QL NAA+PROBE: NOT DETECTED
HPIV3 RNA NPH QL NAA+PROBE: NOT DETECTED
HPIV4 P GENE NPH QL NAA+PROBE: NOT DETECTED
HYALINE CASTS UR QL AUTO: ABNORMAL /LPF
KETONES UR QL STRIP: ABNORMAL
LEUKOCYTE ESTERASE UR QL STRIP.AUTO: ABNORMAL
LYMPHOCYTES # BLD AUTO: 0.37 10*3/MM3 (ref 0.7–3.1)
LYMPHOCYTES NFR BLD AUTO: 7 % (ref 19.6–45.3)
M PNEUMO IGG SER IA-ACNC: NOT DETECTED
MCH RBC QN AUTO: 30.4 PG (ref 26.6–33)
MCHC RBC AUTO-ENTMCNC: 34.2 G/DL (ref 31.5–35.7)
MCV RBC AUTO: 89.1 FL (ref 79–97)
MONOCYTES # BLD AUTO: 0.64 10*3/MM3 (ref 0.1–0.9)
MONOCYTES NFR BLD AUTO: 12.2 % (ref 5–12)
NEUTROPHILS NFR BLD AUTO: 4.2 10*3/MM3 (ref 1.7–7)
NEUTROPHILS NFR BLD AUTO: 80 % (ref 42.7–76)
NITRITE UR QL STRIP: NEGATIVE
PH UR STRIP.AUTO: 6 [PH] (ref 5–8)
PLATELET # BLD AUTO: 136 10*3/MM3 (ref 140–450)
PMV BLD AUTO: 11.1 FL (ref 6–12)
POTASSIUM SERPL-SCNC: 3.6 MMOL/L (ref 3.5–5.2)
PROT SERPL-MCNC: 5.9 G/DL (ref 6–8.5)
PROT UR QL STRIP: ABNORMAL
RBC # BLD AUTO: 3.12 10*6/MM3 (ref 3.77–5.28)
RBC # UR STRIP: ABNORMAL /HPF
REF LAB TEST METHOD: ABNORMAL
RHINOVIRUS RNA SPEC NAA+PROBE: NOT DETECTED
RSV RNA NPH QL NAA+NON-PROBE: NOT DETECTED
S PYO AG THROAT QL: NEGATIVE
SARS-COV-2 RNA NPH QL NAA+NON-PROBE: DETECTED
SODIUM SERPL-SCNC: 132 MMOL/L (ref 136–145)
SP GR UR STRIP: 1.02 (ref 1–1.03)
SQUAMOUS #/AREA URNS HPF: ABNORMAL /HPF
UROBILINOGEN UR QL STRIP: ABNORMAL
WBC # UR STRIP: ABNORMAL /HPF
WBC NRBC COR # BLD: 5.25 10*3/MM3 (ref 3.4–10.8)
WHOLE BLOOD HOLD COAG: NORMAL
WHOLE BLOOD HOLD SPECIMEN: NORMAL

## 2022-08-30 PROCEDURE — 81001 URINALYSIS AUTO W/SCOPE: CPT | Performed by: NURSE PRACTITIONER

## 2022-08-30 PROCEDURE — 85025 COMPLETE CBC W/AUTO DIFF WBC: CPT | Performed by: NURSE PRACTITIONER

## 2022-08-30 PROCEDURE — 0202U NFCT DS 22 TRGT SARS-COV-2: CPT | Performed by: NURSE PRACTITIONER

## 2022-08-30 PROCEDURE — 99283 EMERGENCY DEPT VISIT LOW MDM: CPT

## 2022-08-30 PROCEDURE — 87081 CULTURE SCREEN ONLY: CPT | Performed by: NURSE PRACTITIONER

## 2022-08-30 PROCEDURE — 80053 COMPREHEN METABOLIC PANEL: CPT | Performed by: NURSE PRACTITIONER

## 2022-08-30 PROCEDURE — 87880 STREP A ASSAY W/OPTIC: CPT | Performed by: NURSE PRACTITIONER

## 2022-08-30 RX ORDER — SODIUM CHLORIDE 0.9 % (FLUSH) 0.9 %
10 SYRINGE (ML) INJECTION AS NEEDED
Status: DISCONTINUED | OUTPATIENT
Start: 2022-08-30 | End: 2022-08-30 | Stop reason: HOSPADM

## 2022-08-30 RX ORDER — NITROFURANTOIN 25; 75 MG/1; MG/1
100 CAPSULE ORAL 2 TIMES DAILY
Qty: 14 CAPSULE | Refills: 0 | Status: SHIPPED | OUTPATIENT
Start: 2022-08-30 | End: 2022-09-06

## 2022-08-30 RX ADMIN — SODIUM CHLORIDE 1000 ML: 9 INJECTION, SOLUTION INTRAVENOUS at 11:18

## 2022-09-01 LAB — BACTERIA SPEC AEROBE CULT: NORMAL

## 2022-09-09 ENCOUNTER — TELEPHONE (OUTPATIENT)
Dept: OBSTETRICS AND GYNECOLOGY | Facility: CLINIC | Age: 22
End: 2022-09-09

## 2022-09-09 RX ORDER — PNV NO.95/FERROUS FUM/FOLIC AC 28MG-0.8MG
325 TABLET ORAL
Qty: 30 TABLET | Refills: 1 | Status: SHIPPED | OUTPATIENT
Start: 2022-09-09 | End: 2023-02-10

## 2022-09-09 NOTE — TELEPHONE ENCOUNTER
Caller: Odette Gar    Relationship to patient: Self    Best call back number: [phone]    Chief complaint:     Type of visit: PT IS NEEDING TO MAKE HER OB FOLLOW UP W/ ANATOMY U/S AROUND 20-22 WEEKS, [facility] HAS NO AVAILABILITY.        Requested date: 4 WEEKS AFTER NEXT OB FOLLOW UP      If rescheduling, when is the original appointment: NA    Additional notes:    PT WENT TO ER LAST WEEK (08/30) AND WAS TOLD SHE WAS ANEMIA SHE WAS TOLD TO TAKE IRON SUPPLEMENT AND BLOOD SUGAR WAS LOW, WOULD LIKE SPEAK WITH DR. MEADE OR NURSE, SHE STATES THAT EVERYDAY AT WORK SHE FEELS SOMETIMES SHE COULD PASS OUT, NO OTHER SYMPTOMS NOTED.       WOULD LIKE A CALLBACK ANYTIME, OK TO LEAVE A VM.

## 2022-09-09 NOTE — TELEPHONE ENCOUNTER
Spoke to Odette to let her know that I asked the on call doctor to send in the iron supplement for her today, but most have to wait until Monday this late in the day on Friday. If the iron he prescribed is too expensive, you can get it in the vitamin section of the pharmacy and work as well at the prescription. Dr Morales said your iron was not too bad and the primary cause of feeling lightheaded is not hydrating enough or improper nutrition and sleep. He recommends you drink at least 10 glasses of water daily, no skipping meals, including breakfast. Try to eat protein and vegetables and no fast or junk food. Also he wants you to go to bed by at least 10 pm and get at least 8 hours of sleep each night. Dr Morales just wants you to take good care of yourself. Have a great weekend. Thank you.

## 2022-09-09 NOTE — TELEPHONE ENCOUNTER
Wilda    I called this in.  Please gently remind her that most requests from refills late in the day on Friday will not occur till Monday, especially since her diagnosis occurred one week ago.    Also - iron can be obtained over the counter if she finds that the prescription form is too expensive.  Iron can be found in the vitamin section of the pharmacy.  It will work the same as a prescription.    Finally - her iron level is not too bad.  The primary cause of feeling lightheaded is not hydrating enough, or improper nutrition and sleep.    1)  She should drink at least 10 glasses of water per day.    2)  She should not skip any meal including breakfast.    3) She should go to be by at least 10 pm and try to obtain 8 hours of sleep each night.    Andrew

## 2022-09-09 NOTE — TELEPHONE ENCOUNTER
ELAINE spears. Bryon pt. 16wk1d.OB [birth year]. Pt seen in ED on 8/30/22 - +Covid and UTI. Her Hgb & Hct were 9.5 & 27.8 respectively. Would you mind to send her iron supplement to her [facility] pharmacy on file. I believe everyone is gone from the office and pt states she feels like she could pass out.  I would be very grateful. Thank you.

## 2022-09-23 ENCOUNTER — ROUTINE PRENATAL (OUTPATIENT)
Dept: OBSTETRICS AND GYNECOLOGY | Facility: CLINIC | Age: 22
End: 2022-09-23

## 2022-09-23 VITALS — DIASTOLIC BLOOD PRESSURE: 66 MMHG | WEIGHT: 121 LBS | BODY MASS INDEX: 23.63 KG/M2 | SYSTOLIC BLOOD PRESSURE: 112 MMHG

## 2022-09-23 DIAGNOSIS — Z34.00 SUPERVISION OF NORMAL FIRST PREGNANCY, ANTEPARTUM: Primary | ICD-10-CM

## 2022-09-23 DIAGNOSIS — O21.0 HYPEREMESIS GRAVIDARUM: ICD-10-CM

## 2022-09-23 DIAGNOSIS — G25.81 RESTLESS LEGS: ICD-10-CM

## 2022-09-23 LAB
GLUCOSE UR STRIP-MCNC: NEGATIVE MG/DL
PROT UR STRIP-MCNC: NEGATIVE MG/DL

## 2022-09-23 PROCEDURE — 99213 OFFICE O/P EST LOW 20 MIN: CPT | Performed by: OBSTETRICS & GYNECOLOGY

## 2022-09-23 NOTE — PROGRESS NOTES
"Chief Complaint   Patient presents with   • Routine Prenatal Visit     Pt has c/o \"restless legs\"      Odette Gar is a 22 y.o.  at 18w1d  here for routine OB visit  No bleeding or LOF. Nausea has improved.  She reports she feels much better. No fever from COVID - mostly just head hurt.  Restless legs, plans to  Iron today  Fetal movement not yet felt    /66   Wt 54.9 kg (121 lb)   LMP 2022   BMI 23.63 kg/m²    Gen: NAD, well appearing  Abd: nontender  See OB Flowsheet    ASSESSMENT/PLAN:  (Z34.00) Supervision of normal first pregnancy, antepartum    (O21.0) Hyperemesis gravidarum    (G25.81) Restless legs    Reviewed restless leg precautions  Aware of indications for follow up  Reviewed common second trimester symptoms.  Reviewed precautions for signs of  labor, anticipated fetal movements.     Return in about 2 weeks (around 10/7/2022) for anatomy US, 4 weeks OB visit.                       "

## 2022-10-21 ENCOUNTER — ROUTINE PRENATAL (OUTPATIENT)
Dept: OBSTETRICS AND GYNECOLOGY | Facility: CLINIC | Age: 22
End: 2022-10-21

## 2022-10-21 VITALS — WEIGHT: 130.6 LBS | BODY MASS INDEX: 25.51 KG/M2 | DIASTOLIC BLOOD PRESSURE: 73 MMHG | SYSTOLIC BLOOD PRESSURE: 122 MMHG

## 2022-10-21 DIAGNOSIS — N63.10 MASS OF RIGHT BREAST, UNSPECIFIED QUADRANT: ICD-10-CM

## 2022-10-21 DIAGNOSIS — O99.891 ASYMPTOMATIC BACTERIURIA IN PREGNANCY: ICD-10-CM

## 2022-10-21 DIAGNOSIS — O26.899 RH NEGATIVE, ANTEPARTUM: ICD-10-CM

## 2022-10-21 DIAGNOSIS — R82.71 ASYMPTOMATIC BACTERIURIA IN PREGNANCY: ICD-10-CM

## 2022-10-21 DIAGNOSIS — Z34.00 SUPERVISION OF NORMAL FIRST PREGNANCY, ANTEPARTUM: Primary | ICD-10-CM

## 2022-10-21 DIAGNOSIS — Z67.91 RH NEGATIVE, ANTEPARTUM: ICD-10-CM

## 2022-10-21 PROCEDURE — 90471 IMMUNIZATION ADMIN: CPT | Performed by: OBSTETRICS & GYNECOLOGY

## 2022-10-21 PROCEDURE — 90686 IIV4 VACC NO PRSV 0.5 ML IM: CPT | Performed by: OBSTETRICS & GYNECOLOGY

## 2022-10-21 PROCEDURE — 0502F SUBSEQUENT PRENATAL CARE: CPT | Performed by: OBSTETRICS & GYNECOLOGY

## 2022-10-21 NOTE — PROGRESS NOTES
Chief Complaint   Patient presents with   • Routine Prenatal Visit      Odette Gar is a 22 y.o.  at 22w1d  here for routine OB visit  No bleeding or LOF. Taking oral iron without issue  Fetal movement noted some  Interested in flu shot and Tdap    /73   Wt 59.2 kg (130 lb 9.6 oz)   LMP 2022   BMI 25.51 kg/m²    Gen: NAD, well appearing  Abd: nontender  See OB Flowsheet    ASSESSMENT/PLAN:  (Z34.00) Supervision of normal first pregnancy, antepartum - Plan: Gestational Screen 1 Hr ([facility]), CBC (No Diff)    (N63.10) Mass of right breast, unspecified quadrant - REPEAT US IN 6 MONTHS (2023)    (O26.899,  Z67.91) Rh negative, antepartum - Plan: Antibody Screen    (O99.891,  R82.71) Asymptomatic bacteriuria in pregnancy - Plan: Urine Culture - Urine, Urine, Clean Catch    Counseled on flu shot and tdap  Reviewed anatomy Us  Reviewed GCT, CBC, AB screen next visit; rhogam at 28 weeks  Reviewed common second trimester symptoms.  Reviewed precautions for signs of  labor, anticipated fetal movements.     Return in about 4 weeks (around 2022) for GCT, OB visit; 6 weeks nursing visit for Rhogam.

## 2022-10-23 LAB
BACTERIA UR CULT: NORMAL
BACTERIA UR CULT: NORMAL

## 2022-11-22 ENCOUNTER — ROUTINE PRENATAL (OUTPATIENT)
Dept: OBSTETRICS AND GYNECOLOGY | Facility: CLINIC | Age: 22
End: 2022-11-22

## 2022-11-22 VITALS — SYSTOLIC BLOOD PRESSURE: 114 MMHG | WEIGHT: 140 LBS | BODY MASS INDEX: 27.34 KG/M2 | DIASTOLIC BLOOD PRESSURE: 61 MMHG

## 2022-11-22 DIAGNOSIS — Z67.91 RH NEGATIVE, ANTEPARTUM: ICD-10-CM

## 2022-11-22 DIAGNOSIS — N63.10 MASS OF RIGHT BREAST, UNSPECIFIED QUADRANT: ICD-10-CM

## 2022-11-22 DIAGNOSIS — Z34.00 SUPERVISION OF NORMAL FIRST PREGNANCY, ANTEPARTUM: Primary | ICD-10-CM

## 2022-11-22 DIAGNOSIS — O26.899 RH NEGATIVE, ANTEPARTUM: ICD-10-CM

## 2022-11-22 LAB
ERYTHROCYTE [DISTWIDTH] IN BLOOD BY AUTOMATED COUNT: 11.9 % (ref 12.3–15.4)
GLUCOSE 1H P 50 G GLC PO SERPL-MCNC: 124 MG/DL (ref 65–139)
HCT VFR BLD AUTO: 30.5 % (ref 34–46.6)
HGB BLD-MCNC: 10.2 G/DL (ref 12–15.9)
MCH RBC QN AUTO: 30.8 PG (ref 26.6–33)
MCHC RBC AUTO-ENTMCNC: 33.4 G/DL (ref 31.5–35.7)
MCV RBC AUTO: 92.1 FL (ref 79–97)
PLATELET # BLD AUTO: 172 10*3/MM3 (ref 140–450)
RBC # BLD AUTO: 3.31 10*6/MM3 (ref 3.77–5.28)
WBC # BLD AUTO: 10.99 10*3/MM3 (ref 3.4–10.8)

## 2022-11-22 PROCEDURE — 99213 OFFICE O/P EST LOW 20 MIN: CPT | Performed by: OBSTETRICS & GYNECOLOGY

## 2022-11-22 NOTE — PROGRESS NOTES
Chief Complaint   Patient presents with   • Routine Prenatal Visit      Odette Gar is a 22 y.o.  at 26w5d  here for routine OB visit  No bleeding or LOF.   Fetal movement normal    /61   Wt 63.5 kg (140 lb)   LMP 2022   BMI 27.34 kg/m²    Gen: NAD, well appearing  Abd: nontender    See OB Flowsheet    ASSESSMENT/PLAN:  (Z34.00) Supervision of normal first pregnancy, antepartum - GCT, CBC today; Ur Cx negative last visit    (O26.899,  Z67.91) Rh negative, antepartum - Rhogam at 28 weeks, AB screen today    (N63.10) Mass of right breast, unspecified quadrant - Plan for follow up imaging in 2023    Reviewed common second trimester symptoms.  Reviewed precautions for signs of  labor, anticipated fetal movements.         Return in about 2 weeks (around 2022) for Rhogam injection, 4 weeks OB visit.

## 2022-11-23 LAB — BLD GP AB SCN SERPL QL: NEGATIVE

## 2022-12-06 ENCOUNTER — CLINICAL SUPPORT (OUTPATIENT)
Dept: OBSTETRICS AND GYNECOLOGY | Facility: CLINIC | Age: 22
End: 2022-12-06

## 2022-12-06 DIAGNOSIS — Z3A.28 28 WEEKS GESTATION OF PREGNANCY: Primary | ICD-10-CM

## 2022-12-06 PROCEDURE — 96372 THER/PROPH/DIAG INJ SC/IM: CPT | Performed by: OBSTETRICS & GYNECOLOGY

## 2022-12-21 ENCOUNTER — ROUTINE PRENATAL (OUTPATIENT)
Dept: OBSTETRICS AND GYNECOLOGY | Facility: CLINIC | Age: 22
End: 2022-12-21

## 2022-12-21 VITALS — SYSTOLIC BLOOD PRESSURE: 124 MMHG | BODY MASS INDEX: 29.02 KG/M2 | DIASTOLIC BLOOD PRESSURE: 77 MMHG | WEIGHT: 148.6 LBS

## 2022-12-21 DIAGNOSIS — Z23 NEED FOR TDAP VACCINATION: ICD-10-CM

## 2022-12-21 DIAGNOSIS — O26.843 FUNDAL HEIGHT LOW FOR DATES IN THIRD TRIMESTER: ICD-10-CM

## 2022-12-21 DIAGNOSIS — M54.9 BACK PAIN AFFECTING PREGNANCY IN THIRD TRIMESTER: ICD-10-CM

## 2022-12-21 DIAGNOSIS — Z67.91 RH NEGATIVE STATUS DURING PREGNANCY IN THIRD TRIMESTER: ICD-10-CM

## 2022-12-21 DIAGNOSIS — O26.893 RH NEGATIVE STATUS DURING PREGNANCY IN THIRD TRIMESTER: ICD-10-CM

## 2022-12-21 DIAGNOSIS — Z3A.30 30 WEEKS GESTATION OF PREGNANCY: Primary | ICD-10-CM

## 2022-12-21 DIAGNOSIS — N63.10 MASS OF RIGHT BREAST, UNSPECIFIED QUADRANT: ICD-10-CM

## 2022-12-21 DIAGNOSIS — O99.891 BACK PAIN AFFECTING PREGNANCY IN THIRD TRIMESTER: ICD-10-CM

## 2022-12-21 PROCEDURE — 99213 OFFICE O/P EST LOW 20 MIN: CPT | Performed by: NURSE PRACTITIONER

## 2022-12-21 PROCEDURE — 90715 TDAP VACCINE 7 YRS/> IM: CPT | Performed by: NURSE PRACTITIONER

## 2022-12-21 PROCEDURE — 90471 IMMUNIZATION ADMIN: CPT | Performed by: NURSE PRACTITIONER

## 2022-12-21 NOTE — PROGRESS NOTES
Chief Complaint   Patient presents with   • Routine Prenatal Visit   • Injections     Tdap       OB follow up     Odette Gar is a 22 y.o.  30w6d being seen today for her obstetrical visit.  Patient reports low back pain that is constant, denies dysuria. Fetal movement: normal.     Review of Systems  Cramping/contractions: denies  Vaginal bleeding: denies  Fetal movement: normal    /77   Wt 67.4 kg (148 lb 9.6 oz)   LMP 2022   BMI 29.02 kg/m²     Assessment/Plan    Diagnoses and all orders for this visit:    1. 30 weeks gestation of pregnancy (Primary)    2. Rh negative status during pregnancy in third trimester    3. Mass of right breast, unspecified quadrant    4. Back pain affecting pregnancy in third trimester    5. Need for Tdap vaccination  -     Tdap Vaccine Greater Than or Equal To 6yo IM    6. Fundal height low for dates in third trimester  -     US ob follow up transabdominal approach; Future       S/p rhogam 22  Tdap today  Planning breast imaging 2023 for right breast mass  Discussed low back pain in pregnancy very common, encouraged maternity support belt, tylenol PRN pain.  Growth scan at next visit  Reviewed fetal kick counts  Reviewed S&S PTL  Reviewed this stage of pregnancy  Problem list updated     Follow up in 2 week(s) with Dr. Slater    I spent 22 minutes caring for Odette on this date of service. This time includes time spent by me in the following activities: preparing for the visit, reviewing tests, obtaining and/or reviewing a separately obtained history, performing a medically appropriate examination and/or evaluation, counseling and educating the patient/family/caregiver, ordering medications, tests, or procedures, referring and communicating with other health care professionals and documenting information in the medical record      Julia Schwarz, APRN  2022  11:39 EST

## 2023-01-06 ENCOUNTER — ROUTINE PRENATAL (OUTPATIENT)
Dept: OBSTETRICS AND GYNECOLOGY | Facility: CLINIC | Age: 23
End: 2023-01-06
Payer: COMMERCIAL

## 2023-01-06 VITALS — BODY MASS INDEX: 29.06 KG/M2 | SYSTOLIC BLOOD PRESSURE: 124 MMHG | DIASTOLIC BLOOD PRESSURE: 85 MMHG | WEIGHT: 148.8 LBS

## 2023-01-06 DIAGNOSIS — O36.5931 IUGR (INTRAUTERINE GROWTH RESTRICTION) AFFECTING CARE OF MOTHER, THIRD TRIMESTER, FETUS 1: Primary | ICD-10-CM

## 2023-01-06 DIAGNOSIS — Z34.00 SUPERVISION OF NORMAL FIRST PREGNANCY, ANTEPARTUM: ICD-10-CM

## 2023-01-06 LAB
GLUCOSE UR STRIP-MCNC: NEGATIVE MG/DL
PROT UR STRIP-MCNC: NEGATIVE MG/DL

## 2023-01-06 PROCEDURE — 99213 OFFICE O/P EST LOW 20 MIN: CPT | Performed by: OBSTETRICS & GYNECOLOGY

## 2023-01-06 RX ORDER — CETIRIZINE HYDROCHLORIDE 10 MG/1
10 TABLET ORAL DAILY
COMMUNITY
Start: 2022-12-22 | End: 2023-01-24

## 2023-01-06 NOTE — PROGRESS NOTES
Chief Complaint   Patient presents with   • Routine Prenatal Visit      Odette Gar is a 22 y.o.  at 33w1d  here for routine OB visit  No bleeding or LOF.   Fetal movement normal  Reports some foot arch pain and arm numbness at night    /85   Wt 67.5 kg (148 lb 12.8 oz)   LMP 2022   BMI 29.06 kg/m²    Gen: NAD, well appearing  Abd: nontender    See OB Flowsheet    ASSESSMENT/PLAN:  (O36.5931) IUGR (intrauterine growth restriction) affecting care of mother, third trimester, fetus 1 - Plan: US Fetal Biophysical Profile;Without Non-Stress Testing    (Z34.00) Supervision of normal first pregnancy, antepartum  Reviewed US which shows small AC. Will get weekly BPP and dopplers, repeat growth US in 4 weeks    Reviewed common symptoms of the third trimester.  Counseled on labor precautions and anticipated fetal movements.  Reviewed kick counts.  Patient is aware of the location of L&D.         Return in about 1 week (around 2023) for weekly BPP, dopplers, OB visit in 2 weeks, 4 weeks for growth US with BPP/doppler.

## 2023-01-10 ENCOUNTER — HOSPITAL ENCOUNTER (EMERGENCY)
Facility: HOSPITAL | Age: 23
Discharge: HOME | End: 2023-01-10
Attending: OBSTETRICS & GYNECOLOGY | Admitting: OBSTETRICS & GYNECOLOGY
Payer: COMMERCIAL

## 2023-01-10 ENCOUNTER — TELEPHONE (OUTPATIENT)
Dept: OBSTETRICS AND GYNECOLOGY | Facility: CLINIC | Age: 23
End: 2023-01-10
Payer: COMMERCIAL

## 2023-01-10 VITALS
BODY MASS INDEX: 30.04 KG/M2 | DIASTOLIC BLOOD PRESSURE: 72 MMHG | RESPIRATION RATE: 16 BRPM | OXYGEN SATURATION: 100 % | SYSTOLIC BLOOD PRESSURE: 121 MMHG | TEMPERATURE: 98.2 F | HEIGHT: 60 IN | WEIGHT: 153 LBS | HEART RATE: 87 BPM

## 2023-01-10 LAB
BACTERIA UR QL AUTO: ABNORMAL /HPF
BILIRUB UR QL STRIP: NEGATIVE
CLARITY UR: ABNORMAL
COLOR UR: YELLOW
GLUCOSE UR STRIP-MCNC: NEGATIVE MG/DL
HGB UR QL STRIP.AUTO: NEGATIVE
HYALINE CASTS UR QL AUTO: ABNORMAL /LPF
KETONES UR QL STRIP: NEGATIVE
LEUKOCYTE ESTERASE UR QL STRIP.AUTO: ABNORMAL
NITRITE UR QL STRIP: NEGATIVE
PH UR STRIP.AUTO: 7.5 [PH] (ref 5–8)
PROT UR QL STRIP: NEGATIVE
RBC # UR STRIP: ABNORMAL /HPF
REF LAB TEST METHOD: ABNORMAL
SP GR UR STRIP: 1.01 (ref 1–1.03)
SQUAMOUS #/AREA URNS HPF: ABNORMAL /HPF
UROBILINOGEN UR QL STRIP: ABNORMAL
WBC # UR STRIP: ABNORMAL /HPF

## 2023-01-10 PROCEDURE — 59025 FETAL NON-STRESS TEST: CPT

## 2023-01-10 PROCEDURE — 81001 URINALYSIS AUTO W/SCOPE: CPT | Performed by: OBSTETRICS & GYNECOLOGY

## 2023-01-10 PROCEDURE — 99282 EMERGENCY DEPT VISIT SF MDM: CPT | Performed by: OBSTETRICS & GYNECOLOGY

## 2023-01-10 PROCEDURE — 99202 OFFICE O/P NEW SF 15 MIN: CPT

## 2023-01-10 PROCEDURE — G0378 HOSPITAL OBSERVATION PER HR: HCPCS

## 2023-01-10 PROCEDURE — 87086 URINE CULTURE/COLONY COUNT: CPT | Performed by: OBSTETRICS & GYNECOLOGY
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
## 2023-01-10 NOTE — NURSING NOTE
Patient discharged home after several hours of monitoring.  Discussed warning signs including pain, bleeding, decrease fetal movement.  Pt has appt on Friday with OB. But will return to hospital if any s/s occur.
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
## 2023-01-14 LAB — BACTERIA SPEC AEROBE CULT: NORMAL

## 2023-01-16 ENCOUNTER — TELEPHONE (OUTPATIENT)
Dept: OBSTETRICS AND GYNECOLOGY | Facility: CLINIC | Age: 23
End: 2023-01-16

## 2023-01-17 ENCOUNTER — ROUTINE PRENATAL (OUTPATIENT)
Dept: OBSTETRICS AND GYNECOLOGY | Facility: CLINIC | Age: 23
End: 2023-01-17
Payer: COMMERCIAL

## 2023-01-17 ENCOUNTER — TELEPHONE (OUTPATIENT)
Dept: OBSTETRICS AND GYNECOLOGY | Facility: CLINIC | Age: 23
End: 2023-01-17

## 2023-01-17 VITALS — SYSTOLIC BLOOD PRESSURE: 127 MMHG | BODY MASS INDEX: 29.76 KG/M2 | WEIGHT: 152.4 LBS | DIASTOLIC BLOOD PRESSURE: 82 MMHG

## 2023-01-17 DIAGNOSIS — R92.8 ABNORMAL FINDING ON BREAST IMAGING: ICD-10-CM

## 2023-01-17 DIAGNOSIS — O36.5931 IUGR (INTRAUTERINE GROWTH RESTRICTION) AFFECTING CARE OF MOTHER, THIRD TRIMESTER, FETUS 1: ICD-10-CM

## 2023-01-17 DIAGNOSIS — Z34.80 SUPERVISION OF OTHER NORMAL PREGNANCY, ANTEPARTUM: Primary | ICD-10-CM

## 2023-01-17 LAB
GLUCOSE UR STRIP-MCNC: NEGATIVE MG/DL
PROT UR STRIP-MCNC: NEGATIVE MG/DL

## 2023-01-17 PROCEDURE — 99213 OFFICE O/P EST LOW 20 MIN: CPT | Performed by: OBSTETRICS & GYNECOLOGY
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
## 2023-01-17 NOTE — TELEPHONE ENCOUNTER
Per [facility] pt is scheduled at [facility] on 2/2/23 @ 2pm for Right Breast Limited US.  SR

## 2023-01-17 NOTE — LETTER
January 17, 2023     Patient: Odette Gar   YOB: 2000   Date of Visit: 1/17/2023       To Whom It May Concern:    Please excuse Odette Gar from work effective as of 01/17/23 due to low back pain in pregnancy.  Please let us know if you have any further questions. Thanks, and have a great day!            Sincerely,        Gladis Slater MD    CC: No Recipients

## 2023-01-17 NOTE — TELEPHONE ENCOUNTER
----- Message from Gladis Slater MD sent at 1/17/2023 10:46 AM EST -----  Due for limited right breast US next month. Thanks!

## 2023-01-24 ENCOUNTER — ROUTINE PRENATAL (OUTPATIENT)
Dept: OBSTETRICS AND GYNECOLOGY | Facility: CLINIC | Age: 23
End: 2023-01-24
Payer: COMMERCIAL

## 2023-01-24 VITALS — WEIGHT: 156 LBS | DIASTOLIC BLOOD PRESSURE: 74 MMHG | SYSTOLIC BLOOD PRESSURE: 119 MMHG | BODY MASS INDEX: 30.47 KG/M2

## 2023-01-24 DIAGNOSIS — R92.8 ABNORMAL FINDING ON BREAST IMAGING: ICD-10-CM

## 2023-01-24 DIAGNOSIS — O36.5931 IUGR (INTRAUTERINE GROWTH RESTRICTION) AFFECTING CARE OF MOTHER, THIRD TRIMESTER, FETUS 1: ICD-10-CM

## 2023-01-24 DIAGNOSIS — Z34.80 SUPERVISION OF OTHER NORMAL PREGNANCY, ANTEPARTUM: Primary | ICD-10-CM

## 2023-01-24 LAB
GLUCOSE UR STRIP-MCNC: NEGATIVE MG/DL
PROT UR STRIP-MCNC: NEGATIVE MG/DL

## 2023-01-24 PROCEDURE — 99213 OFFICE O/P EST LOW 20 MIN: CPT | Performed by: OBSTETRICS & GYNECOLOGY

## 2023-01-24 NOTE — PROGRESS NOTES
CC: RANDY Gar is a 22 y.o.  at 35w5d  here for routine OB visit  No bleeding or LOF.   Fetal movement normal    /74   Wt 70.8 kg (156 lb)   LMP 2022   BMI 30.47 kg/m²    Gen: NAD, well appearing  Abd: nontender  Pelvic: normal external genitalia, normal vaginal    See OB Flowsheet    ASSESSMENT/PLAN:  (Z34.80) Supervision of other normal pregnancy, antepartum    (O36.5931) IUGR (intrauterine growth restriction) affecting care of mother, third trimester, fetus 1    (R92.8) Abnormal finding on breast imaging  Reviewed BPP and GBS swab today  Recommend growth US and BPP/dopplers next visit to better time delivery.   Reviewed common symptoms of the third trimester.  Counseled on labor precautions and anticipated fetal movements.  Reviewed kick counts.  Patient is aware of the location of L&D.     Return in about 1 week (around 2023) for BPP, growth US, OB visit.

## 2023-01-28 LAB — B-HEM STREP SPEC QL CULT: NEGATIVE

## 2023-01-31 ENCOUNTER — HOSPITAL ENCOUNTER (EMERGENCY)
Facility: HOSPITAL | Age: 23
Discharge: HOME | End: 2023-01-31
Attending: OBSTETRICS & GYNECOLOGY | Admitting: OBSTETRICS & GYNECOLOGY
Payer: COMMERCIAL

## 2023-01-31 ENCOUNTER — ROUTINE PRENATAL (OUTPATIENT)
Dept: OBSTETRICS AND GYNECOLOGY | Facility: CLINIC | Age: 23
End: 2023-01-31
Payer: COMMERCIAL

## 2023-01-31 VITALS
DIASTOLIC BLOOD PRESSURE: 81 MMHG | TEMPERATURE: 98 F | SYSTOLIC BLOOD PRESSURE: 129 MMHG | HEART RATE: 92 BPM | RESPIRATION RATE: 18 BRPM | HEIGHT: 60 IN | BODY MASS INDEX: 31.01 KG/M2

## 2023-01-31 VITALS — SYSTOLIC BLOOD PRESSURE: 143 MMHG | DIASTOLIC BLOOD PRESSURE: 84 MMHG | BODY MASS INDEX: 31.01 KG/M2 | WEIGHT: 158.8 LBS

## 2023-01-31 DIAGNOSIS — O36.5931 IUGR (INTRAUTERINE GROWTH RESTRICTION) AFFECTING CARE OF MOTHER, THIRD TRIMESTER, FETUS 1: ICD-10-CM

## 2023-01-31 DIAGNOSIS — Z34.80 SUPERVISION OF OTHER NORMAL PREGNANCY, ANTEPARTUM: ICD-10-CM

## 2023-01-31 DIAGNOSIS — O13.3 GESTATIONAL HYPERTENSION, THIRD TRIMESTER: ICD-10-CM

## 2023-01-31 DIAGNOSIS — R03.0 ELEVATED BLOOD PRESSURE READING: Primary | ICD-10-CM

## 2023-01-31 LAB
GLUCOSE UR STRIP-MCNC: NEGATIVE MG/DL
PROT UR STRIP-MCNC: ABNORMAL MG/DL

## 2023-01-31 PROCEDURE — 59025 FETAL NON-STRESS TEST: CPT

## 2023-01-31 PROCEDURE — 99284 EMERGENCY DEPT VISIT MOD MDM: CPT | Performed by: OBSTETRICS & GYNECOLOGY

## 2023-01-31 PROCEDURE — 99214 OFFICE O/P EST MOD 30 MIN: CPT | Performed by: OBSTETRICS & GYNECOLOGY

## 2023-01-31 RX ORDER — ACETAMINOPHEN 500 MG
1000 TABLET ORAL ONCE
Status: COMPLETED | OUTPATIENT
Start: 2023-01-31 | End: 2023-01-31

## 2023-01-31 RX ADMIN — ACETAMINOPHEN 1000 MG: 500 TABLET ORAL at 20:59

## 2023-01-31 NOTE — PROGRESS NOTES
Chief Complaint   Patient presents with   • Routine Prenatal Visit     Has c/o of feet swelling       Odette Gar is a 22 y.o.  at 36w5d  here for routine OB visit  No bleeding or LOF.   Reports a HA last couple days but has not tried taking anything for this. No vision changes  C/o bilateral foot swelling  Fetal movement normal    /84   Wt 72 kg (158 lb 12.8 oz)   LMP 2022   BMI 31.01 kg/m²    Gen: NAD, well appearing  Abd: nontender  Pelvic: Normal external genitalia    See OB Flowsheet    ASSESSMENT/PLAN:  (R03.0) Elevated blood pressure reading    (O36.5931) IUGR (intrauterine growth restriction) affecting care of mother, third trimester, fetus 1 - Plan: US Fetal Biophysical Profile;Without Non-Stress Testing, CBC (No Diff), Comprehensive Metabolic Panel, Protein / Creatinine Ratio, Urine - Urine, Clean Catch  Elevated BP is new.  Recommend labs and reviewed preeclampsia precautions. If HA not improved with rest, tylenol, would recommend going to L&D  Recommend repeat BP in 2 days as she will be 37 weeks, if still elevated plan for IOL. She agrees  Reviewed growth US which is appropriate, no IUGR seen today. BPP 8, dopplers normal  Reviewed common symptoms of the third trimester.  Counseled on labor precautions and anticipated fetal movements.  Reviewed kick counts.  Patient is aware of the location of L&D.     Has breast imaging scheduled for 2      Return in about 2 days (around 2023) for BP check, 1 week OB visit BPP.

## 2023-02-01 ENCOUNTER — TELEPHONE (OUTPATIENT)
Dept: OBSTETRICS AND GYNECOLOGY | Facility: CLINIC | Age: 23
End: 2023-02-01
Payer: COMMERCIAL

## 2023-02-01 LAB
ALBUMIN SERPL-MCNC: 3.5 G/DL (ref 3.9–5)
ALBUMIN/GLOB SERPL: 1.4 {RATIO} (ref 1.2–2.2)
ALP SERPL-CCNC: 139 IU/L (ref 44–121)
ALT SERPL-CCNC: 10 IU/L (ref 0–32)
AST SERPL-CCNC: 14 IU/L (ref 0–40)
BILIRUB SERPL-MCNC: <0.2 MG/DL (ref 0–1.2)
BUN SERPL-MCNC: 6 MG/DL (ref 6–20)
BUN/CREAT SERPL: 10 (ref 9–23)
CALCIUM SERPL-MCNC: 9.8 MG/DL (ref 8.7–10.2)
CHLORIDE SERPL-SCNC: 103 MMOL/L (ref 96–106)
CO2 SERPL-SCNC: 22 MMOL/L (ref 20–29)
CREAT SERPL-MCNC: 0.59 MG/DL (ref 0.57–1)
CREAT UR-MCNC: 104.6 MG/DL
EGFRCR SERPLBLD CKD-EPI 2021: 131 ML/MIN/1.73
ERYTHROCYTE [DISTWIDTH] IN BLOOD BY AUTOMATED COUNT: 12.8 % (ref 11.7–15.4)
GLOBULIN SER CALC-MCNC: 2.5 G/DL (ref 1.5–4.5)
GLUCOSE SERPL-MCNC: 93 MG/DL (ref 70–99)
HCT VFR BLD AUTO: 31.3 % (ref 34–46.6)
HGB BLD-MCNC: 10.3 G/DL (ref 11.1–15.9)
MCH RBC QN AUTO: 30 PG (ref 26.6–33)
MCHC RBC AUTO-ENTMCNC: 32.9 G/DL (ref 31.5–35.7)
MCV RBC AUTO: 91 FL (ref 79–97)
PLATELET # BLD AUTO: 132 X10E3/UL (ref 150–450)
POTASSIUM SERPL-SCNC: 4.3 MMOL/L (ref 3.5–5.2)
PROT SERPL-MCNC: 6 G/DL (ref 6–8.5)
PROT UR-MCNC: 12.5 MG/DL
PROT/CREAT UR: 120 MG/G CREAT (ref 0–200)
RBC # BLD AUTO: 3.43 X10E6/UL (ref 3.77–5.28)
SODIUM SERPL-SCNC: 138 MMOL/L (ref 134–144)
WBC # BLD AUTO: 12 X10E3/UL (ref 3.4–10.8)

## 2023-02-01 NOTE — OBED NOTES
[facility] Note OB    Patient Name: Odetet Gar  YOB: 2000  MRN: [id number]  Admission Date: 2023  7:35 PM  Date of Service: 2023    Chief Complaint: spotting post pelvic exam, was told her BP was high      Subjective     Odette Gar is a 22 y.o. female  at 36w5d with Estimated Date of Delivery: 23 who presents with the chief complaint listed above.Pt noted dark brown spotting with a few pea sized clots post exam, but no active bleeding.Pt reports when she was seen in the office her  BP was elevated to 143/84. She also had a mild HA for which she was planning to take tylenol for but she forgot. They nori her blood today but results not available at this time .   HA relieved by tylenol, py desires to go home     She sees Gladis Slater MD for her prenatal care. Her pregnancy has been complicated by: Rh neg, anemia, UTI, USG today shows baby in 32 %ile and AC is 42 %ile    She describes fetal movement as normal.  She denies rupture of membranes.  She denies vaginal bleeding. She is not feeling contractions.        Objective   Patient Active Problem List    Diagnosis    • Fibroadenoma of breast, unspecified laterality [D24.9]    • Hyperemesis gravidarum [O21.0]    • Pelvic Rami Fracture 2021, healed [S32.591D]    • Supervision of normal first pregnancy, antepartum [Z34.00]    • Mood disorder (HCC) [F39]         OB History    Para Term  AB Living   1 0 0 0 0 0   SAB IAB Ectopic Molar Multiple Live Births   0 0 0 0 1 0      # Outcome Date GA Lbr Mitch/2nd Weight Sex Delivery Anes PTL Lv   1A             1B Current                 Past Medical History:   Diagnosis Date   • Pelvic fracture (HCC)     See at [facility]        Past Surgical History:   Procedure Laterality Date   • KNEE ACL RECONSTRUCTION Left        No current facility-administered medications on file prior to encounter.     Current Outpatient Medications on File Prior to Encounter   Medication Sig  Dispense Refill   • ferrous sulfate 325 (65 Fe) MG tablet Take 1 tablet by mouth Daily With Breakfast. 30 tablet 1   • PRENATAL GUMMY VITAMIN Chew 1 each Daily.         No Known Allergies    Family History   Problem Relation Age of Onset   • Breast cancer Paternal Grandmother 40   • Uterine cancer Maternal Great-Grandmother    • Ovarian cancer Maternal Great-Grandmother    • Lung cancer Maternal Great-Grandmother        Social History     Socioeconomic History   • Marital status: Single   Tobacco Use   • Smoking status: Former   Substance and Sexual Activity   • Alcohol use: Not Currently   • Drug use: Yes     Types: Marijuana     Comment: stopped 6/24/22           Review of Systems   Constitutional: Negative for chills and fever.   Eyes: Negative for photophobia and visual disturbance.   Respiratory: Negative for shortness of breath.    Cardiovascular: Negative for chest pain.   Gastrointestinal: Negative for nausea.   Genitourinary: Positive for vaginal discharge.   Neurological: Positive for headaches.   Psychiatric/Behavioral: The patient is not nervous/anxious.           PHYSICAL EXAM:      VITAL SIGNS:  There were no vitals filed for this visit.         FHT'S:    140s with moderate variability and acccels                                     PHYSICAL EXAM:      General: well developed; well nourished  no acute distress   Heart: Not performed.   Lungs   breathing is unlabored   Abdomen: Gravid and non tender     Extremities: trace edema, DTRs 1 plus, no clonus       Cervix: Scant dk blood, cx 1 cm        Contractions:   irregular                    LABS AND TESTING ORDERED:  1. Uterine and fetal monitoring  2. Urinalysis      LAB RESULTS:    Recent Results (from the past 24 hour(s))   POC Urinalysis Dipstick    Collection Time: 01/31/23 12:00 AM    Specimen: Urine   Result Value Ref Range    Glucose, UA Negative Negative    Protein, POC Trace (A) Negative       Lab Results   Component Value Date    ABO AB  07/01/2022    RH Negative 07/01/2022       Lab Results   Component Value Date    STREPGPB Negative 01/24/2023                 External Prenatal Results     Pregnancy Outside Results - Transcribed From Office Records - See Scanned Records For Details     Test Value Date Time    ABO  AB  07/01/22 0905    Rh  Negative  07/01/22 0905    Antibody Screen  Negative  11/22/22 0930       Negative  07/01/22 0905    Varicella IgG ^ 1.3 AI 05/15/20 1350    Rubella  1.48 index 07/01/22 0905    Hgb  10.2 g/dL 11/22/22 0930       9.5 g/dL 08/30/22 1113       10.5 g/dL 07/10/22 0426       13.0 g/dL 07/09/22 1415       12.9 g/dL 07/01/22 0905    Hct  30.5 % 11/22/22 0930       27.8 % 08/30/22 1113       30.3 % 07/10/22 0426       38.6 % 07/09/22 1415       37.9 % 07/01/22 0905    Glucose Fasting GTT       Glucose Tolerance Test 1 hour       Glucose Tolerance Test 3 hour       Gonorrhea (discrete)  Negative  07/01/22 0859    Chlamydia (discrete)  Negative  07/01/22 0859    RPR  Non Reactive  07/01/22 0905    VDRL       Syphilis Antibody       HBsAg  Negative  07/01/22 0905    Herpes Simplex Virus PCR       Herpes Simplex VIrus Culture       HIV  Non Reactive  07/01/22 0905    Hep C RNA Quant PCR       Hep C Antibody  <0.1 s/co ratio 07/01/22 0905    AFP       Group B Strep  Negative  01/24/23 0159    GBS Susceptibility to Clindamycin       GBS Susceptibility to Erythromycin       Fetal Fibronectin       Genetic Testing, Maternal Blood             Drug Screening     Test Value Date Time    Urine Drug Screen       Amphetamine Screen  Negative ng/mL 07/01/22 0854    Barbiturate Screen  Negative ng/mL 07/01/22 0854    Benzodiazepine Screen  Negative ng/mL 07/01/22 0854    Methadone Screen  Negative ng/mL 07/01/22 0854    Phencyclidine Screen  Negative ng/mL 07/01/22 0854    Opiates Screen       THC Screen       Cocaine Screen       Propoxyphene Screen  Negative ng/mL 07/01/22 0854    Buprenorphine Screen       Methamphetamine Screen        Oxycodone Screen       Tricyclic Antidepressants Screen             Legend    ^: Historical                          Impression:   @ 36w5d .  Final Diagnosis: post pelvic exam spotting,HA    Plan:  1.Tylenol for HA, Discharge to home.    2. Plan of care has been reviewed with patient along with risks, benefits of treatment.   All questions have been answered. Call health care provider for any further concerns and keep office appointments.  3. Labor precautions, comfort measures      I discussed the patients findings and my recommendations with patient, family and nursing staff      Robyn Burns MD  2023  19:56 EST

## 2023-02-01 NOTE — NURSING NOTE
Discharged to home with instructions to keep next MD appointment and to return to Labor and Delivery with complains of contractions that are 5 minutes apart for one hour and are getting stronger, rupture of membranes (water breaking), vaginal bleeding (informed that there might be some vaginal spotting after tonight's cervical exam), increased pain, decreased fetal movement, or with any concerns of self or baby.  Verbalizes understanding of discharge instructions.

## 2023-02-01 NOTE — DISCHARGE INSTRUCTIONS
Discharged to home with instructions to keep next MD appointment and to return to Labor and Delivery with complains of contractions that are 5 minutes apart for one hour and are getting stronger, rupture of membranes (water breaking), vaginal bleeding (informed that there might be some vaginal spotting after tonight's cervical exam), increased pain, decreased fetal movement, or with any concerns of self or baby.

## 2023-02-01 NOTE — NURSING NOTE
"  Odette Gar, a  at 36w5d with an JOSE LUIS of 2023, Alternate JOSE LUIS Entry, was seen at [facility] for a nonstress test.    Chief Complaint   Patient presents with   • Elevated Blood Pressure     Arrived to [facility] with complaints of elevated blood pressures in office today 143/83 and patient said that \"it was higher the second time it was taken, and also complaining of \"vaginal bleeding after today's vaginal exam in the office\".  Denies contractions, and baby has been active.         Patient Active Problem List   Diagnosis   • Mood disorder (HCC)   • Pelvic Rami Fracture 2021, healed   • Supervision of normal first pregnancy, antepartum   • Hyperemesis gravidarum   • Fibroadenoma of breast, unspecified laterality       Start Time:   Stop Time:         ART Ford Rnc, OB              "

## 2023-02-02 ENCOUNTER — TELEPHONE (OUTPATIENT)
Dept: OBSTETRICS AND GYNECOLOGY | Facility: CLINIC | Age: 23
End: 2023-02-02
Payer: COMMERCIAL

## 2023-02-02 ENCOUNTER — CLINICAL SUPPORT (OUTPATIENT)
Dept: OBSTETRICS AND GYNECOLOGY | Facility: CLINIC | Age: 23
End: 2023-02-02
Payer: COMMERCIAL

## 2023-02-02 VITALS — DIASTOLIC BLOOD PRESSURE: 78 MMHG | SYSTOLIC BLOOD PRESSURE: 128 MMHG

## 2023-02-02 DIAGNOSIS — O16.3 ELEVATED BLOOD PRESSURE AFFECTING PREGNANCY IN THIRD TRIMESTER, ANTEPARTUM: Primary | ICD-10-CM

## 2023-02-02 RX ORDER — SODIUM CHLORIDE, SODIUM LACTATE, POTASSIUM CHLORIDE, CALCIUM CHLORIDE 600; 310; 30; 20 MG/100ML; MG/100ML; MG/100ML; MG/100ML
125 INJECTION, SOLUTION INTRAVENOUS CONTINUOUS
Status: CANCELLED | OUTPATIENT
Start: 2023-02-02

## 2023-02-02 RX ORDER — MISOPROSTOL 100 UG/1
25 TABLET ORAL EVERY 4 HOURS PRN
Status: CANCELLED | OUTPATIENT
Start: 2023-02-02

## 2023-02-02 RX ORDER — MISOPROSTOL 100 UG/1
800 TABLET ORAL AS NEEDED
Status: CANCELLED | OUTPATIENT
Start: 2023-02-02

## 2023-02-02 RX ORDER — OXYTOCIN 10 [USP'U]/ML
10 INJECTION, SOLUTION INTRAMUSCULAR; INTRAVENOUS ONCE
Status: CANCELLED | OUTPATIENT
Start: 2023-02-02 | End: 2023-02-02

## 2023-02-02 RX ORDER — CARBOPROST TROMETHAMINE 250 UG/ML
250 INJECTION, SOLUTION INTRAMUSCULAR AS NEEDED
Status: CANCELLED | OUTPATIENT
Start: 2023-02-02

## 2023-02-02 RX ORDER — METHYLERGONOVINE MALEATE 0.2 MG/ML
200 INJECTION INTRAVENOUS ONCE AS NEEDED
Status: CANCELLED | OUTPATIENT
Start: 2023-02-02

## 2023-02-02 RX ORDER — LIDOCAINE HYDROCHLORIDE 10 MG/ML
5 INJECTION, SOLUTION EPIDURAL; INFILTRATION; INTRACAUDAL; PERINEURAL AS NEEDED
Status: CANCELLED | OUTPATIENT
Start: 2023-02-02

## 2023-02-02 RX ORDER — SODIUM CHLORIDE 0.9 % (FLUSH) 0.9 %
10 SYRINGE (ML) INJECTION EVERY 12 HOURS SCHEDULED
Status: CANCELLED | OUTPATIENT
Start: 2023-02-02

## 2023-02-02 RX ORDER — MAGNESIUM CARB/ALUMINUM HYDROX 105-160MG
30 TABLET,CHEWABLE ORAL ONCE
Status: CANCELLED | OUTPATIENT
Start: 2023-02-02 | End: 2023-02-02

## 2023-02-02 RX ORDER — SODIUM CHLORIDE 0.9 % (FLUSH) 0.9 %
1-10 SYRINGE (ML) INJECTION AS NEEDED
Status: CANCELLED | OUTPATIENT
Start: 2023-02-02

## 2023-02-02 NOTE — TELEPHONE ENCOUNTER
Called and spoke with pt regarding BP. She is asymptomatic; meets criteria for GHTN given 2 elevations > 4 hours apart. She is concerned because her mom had preeclampsia in her pregnancy and she agrees to proceeding with IOL.  Will schedule with L&D as pt is 37w today. Aware of risks/benefits of early term delivery, GHTN/preclampsia.  Aware to come to L&D if she becomes symptomatic.

## 2023-02-02 NOTE — TELEPHONE ENCOUNTER
Second b/p reading patient is saying ws charted incorrectly.Second b/p was 128/78 and patient heard 135/78 and Rachel GOETZ for Dr. Slater was right there when I took patients second b/p.

## 2023-02-03 ENCOUNTER — ANESTHESIA (OUTPATIENT)
Dept: LABOR AND DELIVERY | Facility: HOSPITAL | Age: 23
End: 2023-02-03
Payer: COMMERCIAL

## 2023-02-03 ENCOUNTER — TELEPHONE (OUTPATIENT)
Dept: OBSTETRICS AND GYNECOLOGY | Facility: CLINIC | Age: 23
End: 2023-02-03
Payer: COMMERCIAL

## 2023-02-03 ENCOUNTER — ANESTHESIA EVENT (OUTPATIENT)
Dept: LABOR AND DELIVERY | Facility: HOSPITAL | Age: 23
End: 2023-02-03
Payer: COMMERCIAL

## 2023-02-03 ENCOUNTER — HOSPITAL ENCOUNTER (INPATIENT)
Facility: HOSPITAL | Age: 23
LOS: 3 days | Discharge: HOME | End: 2023-02-06
Attending: OBSTETRICS & GYNECOLOGY | Admitting: OBSTETRICS & GYNECOLOGY
Payer: COMMERCIAL

## 2023-02-03 DIAGNOSIS — R03.0 ELEVATED BLOOD PRESSURE READING: ICD-10-CM

## 2023-02-03 DIAGNOSIS — O13.9 GESTATIONAL HYPERTENSION, ANTEPARTUM: ICD-10-CM

## 2023-02-03 DIAGNOSIS — O13.3 GESTATIONAL HYPERTENSION, THIRD TRIMESTER: ICD-10-CM

## 2023-02-03 LAB
ABO GROUP BLD: NORMAL
ALBUMIN SERPL-MCNC: 3.2 G/DL (ref 3.5–5.2)
ALBUMIN/GLOB SERPL: 1.1 G/DL
ALP SERPL-CCNC: 122 U/L (ref 39–117)
ALT SERPL W P-5'-P-CCNC: 10 U/L (ref 1–33)
ANION GAP SERPL CALCULATED.3IONS-SCNC: 11.2 MMOL/L (ref 5–15)
AST SERPL-CCNC: 23 U/L (ref 1–32)
BILIRUB SERPL-MCNC: <0.2 MG/DL (ref 0–1.2)
BLD GP AB SCN SERPL QL: POSITIVE
BUN SERPL-MCNC: 7 MG/DL (ref 6–20)
BUN/CREAT SERPL: 11.1 (ref 7–25)
CALCIUM SPEC-SCNC: 10.1 MG/DL (ref 8.6–10.5)
CHLORIDE SERPL-SCNC: 103 MMOL/L (ref 98–107)
CO2 SERPL-SCNC: 22.8 MMOL/L (ref 22–29)
CREAT SERPL-MCNC: 0.63 MG/DL (ref 0.57–1)
DEPRECATED RDW RBC AUTO: 39.9 FL (ref 37–54)
EGFRCR SERPLBLD CKD-EPI 2021: 128.8 ML/MIN/1.73
ERYTHROCYTE [DISTWIDTH] IN BLOOD BY AUTOMATED COUNT: 12.4 % (ref 12.3–15.4)
GLOBULIN UR ELPH-MCNC: 3 GM/DL
GLUCOSE SERPL-MCNC: 78 MG/DL (ref 65–99)
HCT VFR BLD AUTO: 28 % (ref 34–46.6)
HGB BLD-MCNC: 9.7 G/DL (ref 12–15.9)
MCH RBC QN AUTO: 30.8 PG (ref 26.6–33)
MCHC RBC AUTO-ENTMCNC: 34.6 G/DL (ref 31.5–35.7)
MCV RBC AUTO: 88.9 FL (ref 79–97)
PLATELET # BLD AUTO: 126 10*3/MM3 (ref 140–450)
PMV BLD AUTO: 12.9 FL (ref 6–12)
POTASSIUM SERPL-SCNC: 3.9 MMOL/L (ref 3.5–5.2)
PROT SERPL-MCNC: 6.2 G/DL (ref 6–8.5)
RBC # BLD AUTO: 3.15 10*6/MM3 (ref 3.77–5.28)
RESIDUAL RHIG DETECTED: NORMAL
RH BLD: NEGATIVE
SODIUM SERPL-SCNC: 137 MMOL/L (ref 136–145)
T&S EXPIRATION DATE: NORMAL
WBC NRBC COR # BLD: 10.53 10*3/MM3 (ref 3.4–10.8)

## 2023-02-03 PROCEDURE — 86870 RBC ANTIBODY IDENTIFICATION: CPT | Performed by: OBSTETRICS & GYNECOLOGY

## 2023-02-03 PROCEDURE — 80053 COMPREHEN METABOLIC PANEL: CPT | Performed by: OBSTETRICS & GYNECOLOGY

## 2023-02-03 PROCEDURE — 85027 COMPLETE CBC AUTOMATED: CPT | Performed by: OBSTETRICS & GYNECOLOGY

## 2023-02-03 PROCEDURE — 86901 BLOOD TYPING SEROLOGIC RH(D): CPT | Performed by: OBSTETRICS & GYNECOLOGY

## 2023-02-03 PROCEDURE — 86900 BLOOD TYPING SEROLOGIC ABO: CPT | Performed by: OBSTETRICS & GYNECOLOGY

## 2023-02-03 PROCEDURE — 86850 RBC ANTIBODY SCREEN: CPT | Performed by: OBSTETRICS & GYNECOLOGY

## 2023-02-03 RX ORDER — SODIUM CHLORIDE 0.9 % (FLUSH) 0.9 %
1-10 SYRINGE (ML) INJECTION AS NEEDED
Status: DISCONTINUED | OUTPATIENT
Start: 2023-02-03 | End: 2023-02-05 | Stop reason: HOSPADM

## 2023-02-03 RX ORDER — MAGNESIUM CARB/ALUMINUM HYDROX 105-160MG
30 TABLET,CHEWABLE ORAL ONCE
Status: DISCONTINUED | OUTPATIENT
Start: 2023-02-03 | End: 2023-02-05 | Stop reason: HOSPADM

## 2023-02-03 RX ORDER — LIDOCAINE HYDROCHLORIDE 10 MG/ML
5 INJECTION, SOLUTION EPIDURAL; INFILTRATION; INTRACAUDAL; PERINEURAL AS NEEDED
Status: DISCONTINUED | OUTPATIENT
Start: 2023-02-03 | End: 2023-02-05 | Stop reason: HOSPADM

## 2023-02-03 RX ORDER — SODIUM CHLORIDE 0.9 % (FLUSH) 0.9 %
10 SYRINGE (ML) INJECTION EVERY 12 HOURS SCHEDULED
Status: DISCONTINUED | OUTPATIENT
Start: 2023-02-03 | End: 2023-02-05 | Stop reason: HOSPADM

## 2023-02-03 RX ORDER — MISOPROSTOL 100 MCG
25 TABLET ORAL EVERY 4 HOURS PRN
Status: DISCONTINUED | OUTPATIENT
Start: 2023-02-03 | End: 2023-02-04

## 2023-02-03 RX ORDER — EPHEDRINE SULFATE 50 MG/ML
5 INJECTION, SOLUTION INTRAVENOUS AS NEEDED
Status: DISCONTINUED | OUTPATIENT
Start: 2023-02-03 | End: 2023-02-05 | Stop reason: HOSPADM

## 2023-02-03 RX ORDER — FENTANYL CIT 0.2 MG/100ML-ROPIV 0.2%-NACL 0.9% EPIDURAL INJ 2/0.2 MCG/ML-%
10 SOLUTION INJECTION CONTINUOUS
Status: DISCONTINUED | OUTPATIENT
Start: 2023-02-03 | End: 2023-02-06 | Stop reason: HOSPADM

## 2023-02-03 RX ORDER — SODIUM CHLORIDE, SODIUM LACTATE, POTASSIUM CHLORIDE, CALCIUM CHLORIDE 600; 310; 30; 20 MG/100ML; MG/100ML; MG/100ML; MG/100ML
125 INJECTION, SOLUTION INTRAVENOUS CONTINUOUS
Status: DISCONTINUED | OUTPATIENT
Start: 2023-02-03 | End: 2023-02-06 | Stop reason: HOSPADM

## 2023-02-03 RX ORDER — ONDANSETRON 2 MG/ML
4 INJECTION INTRAMUSCULAR; INTRAVENOUS ONCE AS NEEDED
Status: COMPLETED | OUTPATIENT
Start: 2023-02-03 | End: 2023-02-04

## 2023-02-03 RX ORDER — FAMOTIDINE 10 MG/ML
20 INJECTION, SOLUTION INTRAVENOUS ONCE AS NEEDED
Status: COMPLETED | OUTPATIENT
Start: 2023-02-03 | End: 2023-02-04

## 2023-02-03 RX ORDER — DIPHENHYDRAMINE HYDROCHLORIDE 50 MG/ML
12.5 INJECTION INTRAMUSCULAR; INTRAVENOUS EVERY 8 HOURS PRN
Status: DISCONTINUED | OUTPATIENT
Start: 2023-02-03 | End: 2023-02-05 | Stop reason: HOSPADM

## 2023-02-03 RX ADMIN — SODIUM CHLORIDE, POTASSIUM CHLORIDE, SODIUM LACTATE AND CALCIUM CHLORIDE 125 ML/HR: 600; 310; 30; 20 INJECTION, SOLUTION INTRAVENOUS at 20:23

## 2023-02-03 RX ADMIN — Medication 25 MCG: at 20:51

## 2023-02-03 NOTE — TELEPHONE ENCOUNTER
I spoke with L&D. She is next on the list to come in, but unfortunately they do not have availability at the moment. Please ask her to call back to L&D if she has not heard from L&D by 5PM instead of hourly.  Thanks!

## 2023-02-03 NOTE — TELEPHONE ENCOUNTER
Pt says she been calling L&D since 6pm and they keep telling her that they don't have a bed for her. I advised her to keep calling every hour.

## 2023-02-04 PROCEDURE — 80307 DRUG TEST PRSMV CHEM ANLYZR: CPT | Performed by: OBSTETRICS & GYNECOLOGY

## 2023-02-04 PROCEDURE — C1755 CATHETER, INTRASPINAL: HCPCS | Performed by: ANESTHESIOLOGY

## 2023-02-04 PROCEDURE — 59410 OBSTETRICAL CARE: CPT | Performed by: OBSTETRICS & GYNECOLOGY

## 2023-02-04 PROCEDURE — 88307 TISSUE EXAM BY PATHOLOGIST: CPT

## 2023-02-04 PROCEDURE — 25010000002 ONDANSETRON PER 1 MG: Performed by: STUDENT IN AN ORGANIZED HEALTH CARE EDUCATION/TRAINING PROGRAM

## 2023-02-04 PROCEDURE — G0480 DRUG TEST DEF 1-7 CLASSES: HCPCS | Performed by: OBSTETRICS & GYNECOLOGY

## 2023-02-04 PROCEDURE — 0KQM0ZZ REPAIR PERINEUM MUSCLE, OPEN APPROACH: ICD-10-PCS | Performed by: OBSTETRICS & GYNECOLOGY

## 2023-02-04 PROCEDURE — 3E0DXGC INTRODUCTION OF OTHER THERAPEUTIC SUBSTANCE INTO MOUTH AND PHARYNX, EXTERNAL APPROACH: ICD-10-PCS | Performed by: OBSTETRICS & GYNECOLOGY

## 2023-02-04 RX ORDER — OXYTOCIN/0.9 % SODIUM CHLORIDE 30/500 ML
125 PLASTIC BAG, INJECTION (ML) INTRAVENOUS CONTINUOUS PRN
Status: DISCONTINUED | OUTPATIENT
Start: 2023-02-04 | End: 2023-02-06 | Stop reason: HOSPADM

## 2023-02-04 RX ORDER — OXYTOCIN/0.9 % SODIUM CHLORIDE 30/500 ML
999 PLASTIC BAG, INJECTION (ML) INTRAVENOUS ONCE
Status: DISCONTINUED | OUTPATIENT
Start: 2023-02-04 | End: 2023-02-05 | Stop reason: HOSPADM

## 2023-02-04 RX ORDER — HYDROCODONE BITARTRATE AND ACETAMINOPHEN 5; 325 MG/1; MG/1
1 TABLET ORAL EVERY 4 HOURS PRN
Status: DISCONTINUED | OUTPATIENT
Start: 2023-02-04 | End: 2023-02-06 | Stop reason: HOSPADM

## 2023-02-04 RX ORDER — IBUPROFEN 600 MG/1
600 TABLET ORAL EVERY 8 HOURS PRN
Status: DISCONTINUED | OUTPATIENT
Start: 2023-02-04 | End: 2023-02-06 | Stop reason: HOSPADM

## 2023-02-04 RX ORDER — CARBOPROST TROMETHAMINE 250 UG/ML
250 INJECTION, SOLUTION INTRAMUSCULAR AS NEEDED
Status: DISCONTINUED | OUTPATIENT
Start: 2023-02-04 | End: 2023-02-05 | Stop reason: HOSPADM

## 2023-02-04 RX ORDER — MISOPROSTOL 200 UG/1
800 TABLET ORAL AS NEEDED
Status: DISCONTINUED | OUTPATIENT
Start: 2023-02-04 | End: 2023-02-05 | Stop reason: HOSPADM

## 2023-02-04 RX ORDER — LIDOCAINE HYDROCHLORIDE AND EPINEPHRINE 20; 5 MG/ML; UG/ML
INJECTION, SOLUTION EPIDURAL; INFILTRATION; INTRACAUDAL; PERINEURAL AS NEEDED
Status: DISCONTINUED | OUTPATIENT
Start: 2023-02-04 | End: 2023-02-04 | Stop reason: SURG

## 2023-02-04 RX ORDER — OXYTOCIN 10 [USP'U]/ML
10 INJECTION, SOLUTION INTRAMUSCULAR; INTRAVENOUS ONCE
Status: DISCONTINUED | OUTPATIENT
Start: 2023-02-04 | End: 2023-02-05 | Stop reason: HOSPADM

## 2023-02-04 RX ORDER — ACETAMINOPHEN 500 MG
1000 TABLET ORAL EVERY 6 HOURS PRN
Status: DISCONTINUED | OUTPATIENT
Start: 2023-02-04 | End: 2023-02-06 | Stop reason: HOSPADM

## 2023-02-04 RX ORDER — ERYTHROMYCIN 5 MG/G
OINTMENT OPHTHALMIC
Status: ACTIVE
Start: 2023-02-04 | End: 2023-02-05

## 2023-02-04 RX ORDER — METHYLERGONOVINE MALEATE 0.2 MG/ML
200 INJECTION INTRAVENOUS ONCE AS NEEDED
Status: DISCONTINUED | OUTPATIENT
Start: 2023-02-04 | End: 2023-02-05 | Stop reason: HOSPADM

## 2023-02-04 RX ORDER — PHYTONADIONE 1 MG/.5ML
INJECTION, EMULSION INTRAMUSCULAR; INTRAVENOUS; SUBCUTANEOUS
Status: ACTIVE
Start: 2023-02-04 | End: 2023-02-05

## 2023-02-04 RX ORDER — OXYTOCIN/0.9 % SODIUM CHLORIDE 30/500 ML
2-24 PLASTIC BAG, INJECTION (ML) INTRAVENOUS
Status: DISCONTINUED | OUTPATIENT
Start: 2023-02-04 | End: 2023-02-06 | Stop reason: HOSPADM

## 2023-02-04 RX ORDER — OXYTOCIN/0.9 % SODIUM CHLORIDE 30/500 ML
250 PLASTIC BAG, INJECTION (ML) INTRAVENOUS CONTINUOUS
Status: DISPENSED | OUTPATIENT
Start: 2023-02-04 | End: 2023-02-04

## 2023-02-04 RX ADMIN — ACETAMINOPHEN 1000 MG: 500 TABLET, FILM COATED ORAL at 18:44

## 2023-02-04 RX ADMIN — Medication 8 ML/HR: at 13:02

## 2023-02-04 RX ADMIN — IBUPROFEN 600 MG: 600 TABLET, FILM COATED ORAL at 23:50

## 2023-02-04 RX ADMIN — Medication 2 MILLI-UNITS/MIN: at 06:00

## 2023-02-04 RX ADMIN — LIDOCAINE HYDROCHLORIDE AND EPINEPHRINE 2.5 ML: 20; 5 INJECTION, SOLUTION EPIDURAL; INFILTRATION; INTRACAUDAL; PERINEURAL at 13:00

## 2023-02-04 RX ADMIN — SODIUM CHLORIDE, POTASSIUM CHLORIDE, SODIUM LACTATE AND CALCIUM CHLORIDE 125 ML/HR: 600; 310; 30; 20 INJECTION, SOLUTION INTRAVENOUS at 19:06

## 2023-02-04 RX ADMIN — Medication 250 ML/HR: at 22:32

## 2023-02-04 RX ADMIN — Medication 25 MCG: at 00:50

## 2023-02-04 RX ADMIN — ONDANSETRON 4 MG: 2 INJECTION INTRAMUSCULAR; INTRAVENOUS at 18:35

## 2023-02-04 RX ADMIN — SODIUM CHLORIDE, POTASSIUM CHLORIDE, SODIUM LACTATE AND CALCIUM CHLORIDE 125 ML/HR: 600; 310; 30; 20 INJECTION, SOLUTION INTRAVENOUS at 04:23

## 2023-02-04 RX ADMIN — FAMOTIDINE 20 MG: 10 INJECTION INTRAVENOUS at 21:34

## 2023-02-04 RX ADMIN — SODIUM CHLORIDE, POTASSIUM CHLORIDE, SODIUM LACTATE AND CALCIUM CHLORIDE 125 ML/HR: 600; 310; 30; 20 INJECTION, SOLUTION INTRAVENOUS at 14:01

## 2023-02-04 RX ADMIN — EPHEDRINE SULFATE 5 MG: 50 INJECTION INTRAVENOUS at 13:55

## 2023-02-04 RX ADMIN — SODIUM CHLORIDE, POTASSIUM CHLORIDE, SODIUM LACTATE AND CALCIUM CHLORIDE 125 ML/HR: 600; 310; 30; 20 INJECTION, SOLUTION INTRAVENOUS at 12:23

## 2023-02-04 RX ADMIN — Medication 10 ML/HR: at 20:28

## 2023-02-04 NOTE — ANESTHESIA PROCEDURE NOTES
Labor Epidural      Patient reassessed immediately prior to procedure    Patient location during procedure: OB  Performed By  Anesthesiologist: Vanessa Finn MD  Preanesthetic Checklist  Completed: patient identified, IV checked, site marked, risks and benefits discussed, surgical consent, monitors and equipment checked, pre-op evaluation and timeout performed  Prep:  Pt Position:sitting  Sterile Tech:cap, gloves, gown, mask and sterile barrier  Prep:chlorhexidine gluconate and isopropyl alcohol  Monitoring:blood pressure monitoring, continuous pulse oximetry and EKG  Epidural Block Procedure:  Approach:midline  Guidance:landmark technique and palpation technique  Location:L3-L4  Needle Type:Tuohy  Needle Gauge:17 G  Loss of Resistance Medium: saline  Loss of Resistance: 6cm  Cath Depth at skin:10 cm  Paresthesia: none  Aspiration:negative  Test Dose:negative  Number of Attempts: 2  Post Assessment:  Dressing:occlusive dressing applied and secured with tape  Pt Tolerance:patient tolerated the procedure well with no apparent complications  Complications:no

## 2023-02-04 NOTE — ANESTHESIA PREPROCEDURE EVALUATION
" Anesthesia Evaluation     Patient summary reviewed and Nursing notes reviewed   NPO Solid Status: > 8 hours  NPO Liquid Status: > 2 hours           Airway   Mallampati: II  TM distance: >3 FB  Neck ROM: full  No difficulty expected  Dental      Pulmonary    Cardiovascular     (+) hypertension,       Neuro/Psych  (+) psychiatric history (mood d/o),    GI/Hepatic/Renal/Endo      Musculoskeletal     Abdominal    Substance History      OB/GYN    (+) Pregnant, Preeclampsia, pregnancy induced hypertension        Other                        Anesthesia Plan    ASA 2     epidural     (I have reviewed the patient's history with the patient and the chart, including all pertinent laboratory results and imaging. I have explained the risks of epidural anesthesia including but not limited to hypotension, PDPH, nerve injury, and risk of failure/replacement. Patient understands risks and agrees to proceed.  /82 (BP Location: Left arm, Patient Position: Lying)   Pulse 79   Temp 37 °C (98.6 °F) (Oral)   Resp 16   Ht 152.4 cm (60\")   Wt 72.1 kg (159 lb)   LMP 05/19/2022   SpO2 100%   BMI 31.05 kg/m²   )    Anesthetic plan, risks, benefits, and alternatives have been provided, discussed and informed consent has been obtained with: patient.        CODE STATUS:       "

## 2023-02-04 NOTE — PROGRESS NOTES
Patient doing well with some bloody discharge this morning but tolerating the contractions well.  She is undergoing induction of labor for mild gestational hypertension.  Her highest blood pressure this morning has been 146/91.  She will want epidural eventually.  Fetal tracing is category 1.

## 2023-02-04 NOTE — H&P
[facility]  Obstetric History and Physical    Chief Complaint   Patient presents with   • Scheduled Induction      37.1 presents for scheduled IOL for pre-e. Pt denies any leaking or bleeding, denies ctx, reports +FM       Subjective     Patient is a 22 y.o. female  currently at 37w1d, who presents with gestational hypertension for IOL.    This pregnancy has been complicated by IUGR, resolved on last US.  She also has a h/o abnormal breast imaging with a repeat ultrasound due this month.  Her previous obstetric/gynecological history is not contributory.    Her GHTN is based on mild range blood pressures > 4 hours apart. She is asymptomatic. Her PI labs are pending.       Prenatal Information:  Prenatal Results     POC Urine Glucose/Protein     Test Value Reference Range Date Time    Urine Glucose ^ Negative  Negative 23     Urine Protein ^ Trace  Negative 23           Initial Prenatal Labs     Test Value Reference Range Date Time    Hemoglobin  9.5 g/dL 12.0 - 15.9 22 1113       10.5 g/dL 12.0 - 15.9 07/10/22 0426       13.0 g/dL 12.0 - 15.9 22 1415       12.9 g/dL 11.1 - 15.9 22 0905    Hematocrit  27.8 % 34.0 - 46.6 22 1113       30.3 % 34.0 - 46.6 07/10/22 0426       38.6 % 34.0 - 46.6 22 1415       37.9 % 34.0 - 46.6 22 0905    Platelets  136 10*3/mm3 140 - 450 22 1113       179 10*3/mm3 140 - 450 07/10/22 0426       231 10*3/mm3 140 - 450 22 1415       226 x10E3/uL 150 - 450 22 0905    Rubella IgG  1.48 index Immune >0.99 22 0905    Hepatitis B SAg  Negative  Negative 22 09    Hepatitis C Ab  <0.1 s/co ratio 0.0 - 0.9 22 0905    RPR  Non Reactive  Non Reactive 22 0905    ABO  AB   22 0905    Rh  Negative   22 0905    Antibody Screen  Negative  Negative 22 0905    HIV  Non Reactive  Non Reactive 22 0905    Urine Culture  Test Cancelled   01/10/23 1009       Final report   10/21/22 1129        Final report   08/15/22 0114       Final report   07/01/22 0857    Gonorrhea  Negative  Negative 07/01/22 0859    Chlamydia  Negative  Negative 07/01/22 0859    TSH        HgB A1c   5.2 % 4.8 - 5.6 07/01/22 0905          2nd and 3rd Trimester     Test Value Reference Range Date Time    Hemoglobin (repeated)  9.7 g/dL 12.0 - 15.9 02/03/23 2033       10.3 g/dL 11.1 - 15.9 01/31/23 1603       10.2 g/dL 12.0 - 15.9 11/22/22 0930    Hematocrit (repeated)  28.0 % 34.0 - 46.6 02/03/23 2033       31.3 % 34.0 - 46.6 01/31/23 1603       30.5 % 34.0 - 46.6 11/22/22 0930    Platelets   126 10*3/mm3 140 - 450 02/03/23 2033       132 x10E3/uL 150 - 450 01/31/23 1603       172 10*3/mm3 140 - 450 11/22/22 0930       136 10*3/mm3 140 - 450 08/30/22 1113       179 10*3/mm3 140 - 450 07/10/22 0426       231 10*3/mm3 140 - 450 07/09/22 1415       226 x10E3/uL 150 - 450 07/01/22 0905    GCT  124 mg/dL 65 - 139 11/22/22 0930    Antibody Screen (repeated)  Negative  Negative 11/22/22 0930    GTT Fasting        GTT 1 Hr        GTT 2 Hr        GTT 3 Hr        Group B Strep  Negative  Negative 01/24/23 0159          Drug Screening     Test Value Reference Range Date Time    Amphetamine Screen  Negative ng/mL Dbnbgl=5523 07/01/22 0854    Barbiturate Screen  Negative ng/mL Lhqlgq=582 07/01/22 0854    Benzodiazepine Screen  Negative ng/mL Jilrqj=542 07/01/22 0854    Methadone Screen  Negative ng/mL Abqvaj=071 07/01/22 0854    Phencyclidine Screen  Negative ng/mL Cutoff=25 07/01/22 0854    Opiates Screen  Negative ng/mL Injgkz=236 07/01/22 0854    THC Screen  Positive  Cutoff=50 07/01/22 0854    Cocaine Screen  Negative ng/mL Bmzifq=604 07/01/22 0854    Propoxyphene Screen  Negative ng/mL Ivgrxk=759 07/01/22 0854    Buprenorphine Screen        Methamphetamine Screen        Oxycodone Screen        Tricyclic Antidepressants Screen              Other (Risk screening)     Test Value Reference Range Date Time    Varicella IgG        Parvovirus  IgG        CMV IgG        Cystic Fibrosis        Hemoglobin electrophoresis        NIPT        MSAFP-4        AFP (for NTD only)              Legend    ^: Historical                      External Prenatal Results     Pregnancy Outside Results - Transcribed From Office Records - See Scanned Records For Details     Test Value Date Time    ABO  AB  07/01/22 0905    Rh  Negative  07/01/22 0905    Antibody Screen  Negative  11/22/22 0930       Negative  07/01/22 0905    Varicella IgG ^ 1.3 AI 05/15/20 1350    Rubella  1.48 index 07/01/22 0905    Hgb  9.7 g/dL 02/03/23 2033       10.3 g/dL 01/31/23 1603       10.2 g/dL 11/22/22 0930       9.5 g/dL 08/30/22 1113       10.5 g/dL 07/10/22 0426       13.0 g/dL 07/09/22 1415       12.9 g/dL 07/01/22 0905    Hct  28.0 % 02/03/23 2033       31.3 % 01/31/23 1603       30.5 % 11/22/22 0930       27.8 % 08/30/22 1113       30.3 % 07/10/22 0426       38.6 % 07/09/22 1415       37.9 % 07/01/22 0905    Glucose Fasting GTT       Glucose Tolerance Test 1 hour       Glucose Tolerance Test 3 hour       Gonorrhea (discrete)  Negative  07/01/22 0859    Chlamydia (discrete)  Negative  07/01/22 0859    RPR  Non Reactive  07/01/22 0905    VDRL       Syphilis Antibody       HBsAg  Negative  07/01/22 0905    Herpes Simplex Virus PCR       Herpes Simplex VIrus Culture       HIV  Non Reactive  07/01/22 0905    Hep C RNA Quant PCR       Hep C Antibody  <0.1 s/co ratio 07/01/22 0905    AFP       Group B Strep  Negative  01/24/23 0159    GBS Susceptibility to Clindamycin       GBS Susceptibility to Erythromycin       Fetal Fibronectin       Genetic Testing, Maternal Blood             Drug Screening     Test Value Date Time    Urine Drug Screen       Amphetamine Screen  Negative ng/mL 07/01/22 0854    Barbiturate Screen  Negative ng/mL 07/01/22 0854    Benzodiazepine Screen  Negative ng/mL 07/01/22 0854    Methadone Screen  Negative ng/mL 07/01/22 0854    Phencyclidine Screen  Negative ng/mL 07/01/22  0854    Opiates Screen       THC Screen       Cocaine Screen       Propoxyphene Screen  Negative ng/mL 22 0854    Buprenorphine Screen       Methamphetamine Screen       Oxycodone Screen       Tricyclic Antidepressants Screen             Legend    ^: Historical                         Past OB History:     OB History    Para Term  AB Living   1 0 0 0 0 0   SAB IAB Ectopic Molar Multiple Live Births   0 0 0 0 1 0      # Outcome Date GA Lbr Mitch/2nd Weight Sex Delivery Anes PTL Lv   1A             1B Current                Past Medical History: Past Medical History:   Diagnosis Date   • Pelvic fracture (HCC)     See at [facility]    • Preeclampsia       Past Surgical History Past Surgical History:   Procedure Laterality Date   • KNEE ACL RECONSTRUCTION Left       Family History: Family History   Problem Relation Age of Onset   • Breast cancer Paternal Grandmother 40   • Uterine cancer Maternal Great-Grandmother    • Ovarian cancer Maternal Great-Grandmother    • Lung cancer Maternal Great-Grandmother       Social History:  reports that she has quit smoking. She does not have any smokeless tobacco history on file.   reports that she does not currently use alcohol.   reports that she does not currently use drugs after having used the following drugs: Marijuana.         Review of Systems - Negative except per HPI for 10 point review of systems including General, Psychological, Ophthalmic, ENT, Endocrine, Respiratory, Cardiovascular, Gastrointestinal, Genito-Urinary, Musculoskeletal, Neurological, Dermatological      Objective       Vital Signs Range for the last 24 hours  Temperature: Temp:  [99 °F (37.2 °C)] 99 °F (37.2 °C)   Temp Source: Temp src: Oral   BP: BP: (135-144)/(91-92) 135/92   Pulse: Heart Rate:  [74-94] 74   Respirations: Resp:  [16] 16   SPO2: SpO2:  [97 %-99 %] 99 %   O2 Amount (l/min):     O2 Devices     Weight: Weight:  [72.1 kg (159 lb)] 72.1 kg (159 lb)     Physical  Examination: General appearance - alert, well appearing, and in no distress  Mental status - alert, oriented to person, place, and time  Eyes - sclera anicteric, left eye normal, right eye normal  Chest - no tachypnea, retractions or cyanosis  Heart - normal rate and regular rhythm  Abdomen - soft, nontender, gravid  Pelvic - see exam  Back exam - full range of motion, no tenderness, palpable spasm or pain on motion   Neurological - alert, oriented, normal speech, no focal findings or movement disorder noted   Musculoskeletal - no joint tenderness, deformity or swelling  Extremities - pedal edema trace  Skin - normal coloration and turgor, no rashes, no suspicious skin lesions noted    Presentation: Vertex   Cervix: Exam by:     Dilation:     Effacement:     Station:         Fetal Heart Rate Assessment   Method:     Beats/min:     Baseline:     Varibility:     Accels:     Decels:     Tracing Category:       Uterine Assessment   Method:     Frequency (min):     Ctx Count in 10 min:     Duration:     Intensity:     Intensity by IUPC:     Resting Tone:     Resting Tone by IUPC:     Chappell Units:       Lab Results   Component Value Date    WBC 10.53 02/03/2023    HGB 9.7 (L) 02/03/2023    HCT 28.0 (L) 02/03/2023    MCV 88.9 02/03/2023     (L) 02/03/2023      US: Intrauterine pregnancy at 36w5d  Placental location: Anterior  Normal Interval growth   EFW:6lb 2oz, 32.3%ile, AC:42.7%ile  2777 g  Fetal position: Vertex  Biophysical profile: Reassuring  8/8  LINDSAY: 13.4 cm  Fetal doppler studes: Fetal dopplers:  SD ratio 2.35 <95%ile       Assessment & Plan   Assessment:  1.  Intrauterine pregnancy at 37w1d weeks gestation with reactive, reassuring fetal status.    2.  GHTN: BP normal to mild range  3.  GBS status: Negative  4. Fibroadenoma: repeat breast US due  5. Gestational thrombocytopenia: plt 126  Plan:  1. IOL for GHTN:   - plan for cytotec, reviewed anticipated course of treatment, progression of labor.   Fetal status is reassuring  - no severe features at this time.   2. Plan of care has been reviewed with patient and support person.  Risks, benefits of treatment plan have been discussed.  All questions have been answered.      Gladis Slater MD  2/3/2023  21:01 EST

## 2023-02-04 NOTE — PLAN OF CARE
Problem: Adult Inpatient Plan of Care  Goal: Plan of Care Review  Outcome: Ongoing, Progressing  Flowsheets (Taken 2023 0547)  Progress: improving  Outcome Evaluation:  37.2 weeks IOL for pre-e, BPs remain stable, FHR reassuring, 2 doses of cytotec given with small changein cervix, plan for oxytocin titration to start at 0600.  Goal: Patient-Specific Goal (Individualized)  Outcome: Ongoing, Progressing  Flowsheets (Taken 2023 0547)  Patient-Specific Goals (Include Timeframe): stable BP, epidural given when needed  Anxieties, Fears or Concerns: labor pain  Goal: Absence of Hospital-Acquired Illness or Injury  Outcome: Ongoing, Progressing  Intervention: Identify and Manage Fall Risk  Recent Flowsheet Documentation  Taken 2023 0400 by Tala Morataya RN  Safety Promotion/Fall Prevention: safety round/check completed  Taken 2023 0200 by Tala Morataya RN  Safety Promotion/Fall Prevention: safety round/check completed  Taken 2/3/2023 2300 by Tala Morataya RN  Safety Promotion/Fall Prevention: safety round/check completed  Taken 2/3/2023 1959 by Tala Morataya RN  Safety Promotion/Fall Prevention: safety round/check completed  Intervention: Prevent and Manage VTE (Venous Thromboembolism) Risk  Recent Flowsheet Documentation  Taken 2/3/2023 1959 by Tala Morataya RN  VTE Prevention/Management: (pt would like to ambulate in room as much as possible) patient refused intervention  Range of Motion: active ROM (range of motion) encouraged  Goal: Optimal Comfort and Wellbeing  Outcome: Ongoing, Progressing  Intervention: Provide Person-Centered Care  Recent Flowsheet Documentation  Taken 2/3/2023 1959 by Tala Morataya RN  Trust Relationship/Rapport:   care explained   choices provided   emotional support provided   empathic listening provided   questions answered   questions encouraged   reassurance provided   thoughts/feelings acknowledged  Goal: Readiness for Transition of  Care  Outcome: Ongoing, Progressing  Intervention: Mutually Develop Transition Plan  Recent Flowsheet Documentation  Taken 2/3/2023 2006 by Tala Morataya, RN  Equipment Currently Used at Home: none  Taken 2/3/2023 2005 by Tala Morataya RN  Transportation Anticipated: family or friend will provide  Patient/Family Anticipated Services at Transition: none  Patient/Family Anticipates Transition to: home with family     Problem: Bleeding (Labor)  Goal: Hemostasis  Outcome: Ongoing, Progressing     Problem: Change in Fetal Wellbeing (Labor)  Goal: Stable Fetal Wellbeing  Outcome: Ongoing, Progressing     Problem: Delayed Labor Progression (Labor)  Goal: Effective Progression to Delivery  Outcome: Ongoing, Progressing     Problem: Infection (Labor)  Goal: Absence of Infection Signs and Symptoms  Outcome: Ongoing, Progressing     Problem: Labor Pain (Labor)  Goal: Acceptable Pain Control  Outcome: Ongoing, Progressing     Problem: Uterine Tachysystole (Labor)  Goal: Normal Uterine Contraction Pattern  Outcome: Ongoing, Progressing   Goal Outcome Evaluation:           Progress: improving  Outcome Evaluation:  37.2 weeks IOL for pre-e, BPs remain stable, FHR reassuring, 2 doses of cytotec given with small changein cervix, plan for oxytocin titration to start at 0600.

## 2023-02-05 LAB
ABO GROUP BLD: NORMAL
AMPHET+METHAMPHET UR QL: NEGATIVE
BARBITURATES UR QL SCN: NEGATIVE
BASOPHILS # BLD AUTO: 0.03 10*3/MM3 (ref 0–0.2)
BASOPHILS NFR BLD AUTO: 0.2 % (ref 0–1.5)
BENZODIAZ UR QL SCN: NEGATIVE
CANNABINOIDS SERPL QL: POSITIVE
COCAINE UR QL: NEGATIVE
DEPRECATED RDW RBC AUTO: 43.4 FL (ref 37–54)
EOSINOPHIL # BLD AUTO: 0 10*3/MM3 (ref 0–0.4)
EOSINOPHIL NFR BLD AUTO: 0 % (ref 0.3–6.2)
ERYTHROCYTE [DISTWIDTH] IN BLOOD BY AUTOMATED COUNT: 13 % (ref 12.3–15.4)
FETAL BLEED: NEGATIVE
HCT VFR BLD AUTO: 28.6 % (ref 34–46.6)
HGB BLD-MCNC: 9.5 G/DL (ref 12–15.9)
IMM GRANULOCYTES # BLD AUTO: 0.13 10*3/MM3 (ref 0–0.05)
IMM GRANULOCYTES NFR BLD AUTO: 0.7 % (ref 0–0.5)
LYMPHOCYTES # BLD AUTO: 0.79 10*3/MM3 (ref 0.7–3.1)
LYMPHOCYTES NFR BLD AUTO: 4.5 % (ref 19.6–45.3)
MCH RBC QN AUTO: 30.6 PG (ref 26.6–33)
MCHC RBC AUTO-ENTMCNC: 33.2 G/DL (ref 31.5–35.7)
MCV RBC AUTO: 92.3 FL (ref 79–97)
METHADONE UR QL SCN: NEGATIVE
MONOCYTES # BLD AUTO: 0.99 10*3/MM3 (ref 0.1–0.9)
MONOCYTES NFR BLD AUTO: 5.7 % (ref 5–12)
NEUTROPHILS NFR BLD AUTO: 15.57 10*3/MM3 (ref 1.7–7)
NEUTROPHILS NFR BLD AUTO: 88.9 % (ref 42.7–76)
NRBC BLD AUTO-RTO: 0 /100 WBC (ref 0–0.2)
NUMBER OF DOSES: NORMAL
OPIATES UR QL: NEGATIVE
OXYCODONE UR QL SCN: NEGATIVE
PLATELET # BLD AUTO: 121 10*3/MM3 (ref 140–450)
PMV BLD AUTO: 12.6 FL (ref 6–12)
RBC # BLD AUTO: 3.1 10*6/MM3 (ref 3.77–5.28)
RH BLD: NEGATIVE
WBC NRBC COR # BLD: 17.51 10*3/MM3 (ref 3.4–10.8)

## 2023-02-05 PROCEDURE — 25010000002 RHO D IMMUNE GLOBULIN 1500 UNIT/2ML SOLUTION PREFILLED SYRINGE: Performed by: OBSTETRICS & GYNECOLOGY

## 2023-02-05 PROCEDURE — 86900 BLOOD TYPING SEROLOGIC ABO: CPT | Performed by: OBSTETRICS & GYNECOLOGY

## 2023-02-05 PROCEDURE — 85025 COMPLETE CBC W/AUTO DIFF WBC: CPT | Performed by: OBSTETRICS & GYNECOLOGY

## 2023-02-05 PROCEDURE — 86901 BLOOD TYPING SEROLOGIC RH(D): CPT | Performed by: OBSTETRICS & GYNECOLOGY

## 2023-02-05 PROCEDURE — 85461 HEMOGLOBIN FETAL: CPT | Performed by: OBSTETRICS & GYNECOLOGY

## 2023-02-05 RX ORDER — DIPHENHYDRAMINE HCL 25 MG
25 CAPSULE ORAL NIGHTLY PRN
Status: DISCONTINUED | OUTPATIENT
Start: 2023-02-05 | End: 2023-02-06 | Stop reason: HOSPADM

## 2023-02-05 RX ORDER — ONDANSETRON 2 MG/ML
4 INJECTION INTRAMUSCULAR; INTRAVENOUS EVERY 6 HOURS PRN
Status: DISCONTINUED | OUTPATIENT
Start: 2023-02-05 | End: 2023-02-06 | Stop reason: HOSPADM

## 2023-02-05 RX ORDER — FERROUS SULFATE 325(65) MG
325 TABLET ORAL
Status: DISCONTINUED | OUTPATIENT
Start: 2023-02-05 | End: 2023-02-06 | Stop reason: HOSPADM

## 2023-02-05 RX ORDER — ONDANSETRON 4 MG/1
4 TABLET, FILM COATED ORAL EVERY 6 HOURS PRN
Status: DISCONTINUED | OUTPATIENT
Start: 2023-02-05 | End: 2023-02-06 | Stop reason: HOSPADM

## 2023-02-05 RX ORDER — SODIUM CHLORIDE 0.9 % (FLUSH) 0.9 %
1-10 SYRINGE (ML) INJECTION AS NEEDED
Status: DISCONTINUED | OUTPATIENT
Start: 2023-02-05 | End: 2023-02-06 | Stop reason: HOSPADM

## 2023-02-05 RX ORDER — HYDROCORTISONE 25 MG/G
1 CREAM TOPICAL AS NEEDED
Status: DISCONTINUED | OUTPATIENT
Start: 2023-02-05 | End: 2023-02-06 | Stop reason: HOSPADM

## 2023-02-05 RX ORDER — PRENATAL VIT/IRON FUM/FOLIC AC 27MG-0.8MG
1 TABLET ORAL DAILY
Status: DISCONTINUED | OUTPATIENT
Start: 2023-02-05 | End: 2023-02-06 | Stop reason: HOSPADM

## 2023-02-05 RX ORDER — DOCUSATE SODIUM 100 MG/1
100 CAPSULE, LIQUID FILLED ORAL 2 TIMES DAILY
Status: DISCONTINUED | OUTPATIENT
Start: 2023-02-05 | End: 2023-02-06 | Stop reason: HOSPADM

## 2023-02-05 RX ORDER — BISACODYL 10 MG
10 SUPPOSITORY, RECTAL RECTAL DAILY PRN
Status: DISCONTINUED | OUTPATIENT
Start: 2023-02-05 | End: 2023-02-06 | Stop reason: HOSPADM

## 2023-02-05 RX ADMIN — Medication: at 08:59

## 2023-02-05 RX ADMIN — IBUPROFEN 600 MG: 600 TABLET, FILM COATED ORAL at 16:59

## 2023-02-05 RX ADMIN — HYDROCORTISONE 1 APPLICATION: 25 CREAM TOPICAL at 16:59

## 2023-02-05 RX ADMIN — HYDROCODONE BITARTRATE AND ACETAMINOPHEN 1 TABLET: 5; 325 TABLET ORAL at 22:33

## 2023-02-05 RX ADMIN — HUMAN RHO(D) IMMUNE GLOBULIN 1500 UNITS: 1500 SOLUTION INTRAMUSCULAR; INTRAVENOUS at 14:39

## 2023-02-05 RX ADMIN — IBUPROFEN 600 MG: 600 TABLET, FILM COATED ORAL at 08:57

## 2023-02-05 RX ADMIN — HYDROCODONE BITARTRATE AND ACETAMINOPHEN 1 TABLET: 5; 325 TABLET ORAL at 02:37

## 2023-02-05 RX ADMIN — Medication 1 TABLET: at 08:57

## 2023-02-05 RX ADMIN — DOCUSATE SODIUM 100 MG: 100 CAPSULE, LIQUID FILLED ORAL at 08:57

## 2023-02-05 RX ADMIN — FERROUS SULFATE TAB 325 MG (65 MG ELEMENTAL FE) 325 MG: 325 (65 FE) TAB at 16:14

## 2023-02-05 RX ADMIN — DOCUSATE SODIUM 100 MG: 100 CAPSULE, LIQUID FILLED ORAL at 22:33

## 2023-02-05 NOTE — PLAN OF CARE
Problem: Bleeding (Labor)  Goal: Hemostasis  Outcome: Met     Problem: Change in Fetal Wellbeing (Labor)  Goal: Stable Fetal Wellbeing  Outcome: Met     Problem: Delayed Labor Progression (Labor)  Goal: Effective Progression to Delivery  Outcome: Met     Problem: Infection (Labor)  Goal: Absence of Infection Signs and Symptoms  Outcome: Met     Problem: Labor Pain (Labor)  Goal: Acceptable Pain Control  Outcome: Met     Problem: Uterine Tachysystole (Labor)  Goal: Normal Uterine Contraction Pattern  Outcome: Met   Goal Outcome Evaluation:  Plan of Care Reviewed With: patient, significant other, mother        Progress: improving  Outcome Evaluation: Pt had a vaginal delivery after pushing for ~one hour. VSS. Fundal exam firm, midline, at the umbilicus, with scant to light bleeding. Pt states discomfort around perineum, but denies pain or needing pain medication at this time; ice applied for slight swelling. Willl continue to monitor and transfer to Mother/Baby Unit after recovery.

## 2023-02-05 NOTE — ANESTHESIA POSTPROCEDURE EVALUATION
Patient: Odette Gar    Procedure Summary     Date: 02/04/23 Room / Location:     Anesthesia Start: 1248 Anesthesia Stop: 2208    Procedure: LABOR ANALGESIA Diagnosis:     Scheduled Providers:  Provider: Vanessa Finn MD    Anesthesia Type: epidural ASA Status: 2          Anesthesia Type: epidural    Vitals  Vitals Value Taken Time   /83 02/05/23 0000   Temp 37.1 °C (98.7 °F) 02/04/23 2212   Pulse 78 02/05/23 0000   Resp 16 02/05/23 0000   SpO2 98 % 02/04/23 2224   Vitals shown include unvalidated device data.        Anesthesia Post Evaluation

## 2023-02-05 NOTE — PROGRESS NOTES
Postpartum Progress Note      Status post Vaginal Delivery: Doing well postoperatively.     1) postpartum care immediately following delivery :    Routine care,   2) GHTN: BP normal to mild range since delivery   - plt 121 postpartum   - reviewed recommendation for one week follow up. Has appt scheduled 2/10  3) Postpartum anemia:    - PO Iron ordered  4) Fibroadenoma of breast s/p Biopsy:   - due for repeat imaging   Rh status: AB negative, Baby A pos fetal screen negative  Rubella: Immune   Gender: Female    Subjective     Postpartum Day 1: Vaginal delivery    The patient feels well. The patient denies emotional concerns. Pain is well controlled with current medications. The baby is well. The patient is ambulating well. The patient is tolerating a normal diet.     Objective     Vital signs in last 24 hours:  Temp:  [97.7 °F (36.5 °C)-99.5 °F (37.5 °C)] 98.5 °F (36.9 °C)  Heart Rate:  [] 91  Resp:  [16-18] 18  BP: ()/() 124/78      General:    alert, appears stated age and cooperative   CV: Well perfused   Lungs: No respiratory distress   Abdomen:  Soft, Non-tender    Lochia:  appropriate   Uterine Fundus:   firm   Ext    Edema trace   DVT Evaluation:  No evidence of DVT seen on physical exam.     Lab Results   Component Value Date    WBC 17.51 (H) 02/05/2023    HGB 9.5 (L) 02/05/2023    HCT 28.6 (L) 02/05/2023    MCV 92.3 02/05/2023     (L) 02/05/2023       Gladis Slater MD  2/5/2023  12:02 EST

## 2023-02-05 NOTE — L&D DELIVERY NOTE
[facility]   Vaginal Delivery Note    Patient Name: Odette Gar  : 2000  MRN: [id number]    Date of Delivery: 2023     Diagnosis     Pre & Post-Delivery:  Intrauterine pregnancy at 37w2d  Labor status: Induced Onset of Labor     Gestational hypertension             Problem List    Transfer to Postpartum     Review the Delivery Report for details.     Delivery     Delivery: Vaginal, Spontaneous     YOB: 2023    Time of Birth:  Gestational Age 10:08 PM   37w2d     Anesthesia: Epidural     Delivering clinician: Hugo Richards    Forceps?   No   Vacuum? No    Shoulder dystocia present: No        Delivery narrative: Called to room with patient complete and pushing very well.  She had good pain control with epidural.  Fetal tracing was reassuring.  She pushes and delivery occurs occiput anterior.  There is no nuchal cord.  Gentle downward traction delivers the anterior shoulder without difficulty.  Baby is placed on maternal abdomen for 30 seconds prior to cord clamping.  Cord blood is then obtained and inspection shows a midline second-degree laceration.  We went ahead and repaired that with 2-0 Vicryl Rapide.  I then put gentle traction on the umbilical cord and gentle suprapubic pressure on the uterus and the placenta delivers promptly complete and intact with three-vessel cord.  Mom and baby both doing well good uterine tone is noted at the close the procedure.      Infant     Findings: female  infant     Infant observations: Weight: No birth weight on file.   Length:   in  Observations/Comments:        Apgars:   @ 1 minute /      @ 5 minutes   Infant Name:  Gurjit     Placenta & Cord         Placenta delivered    at        Cord: 3 vessels  present.   Nuchal Cord?  no   Cord blood obtained: Yes    Cord gases obtained:  No    Cord gas results: Venous:  No results found for: PHCVEN    Arterial:  No results found for: PHCART     Repair      Episiotomy: None     No    Lacerations: Yes   Laceration Information  Laceration Repaired?   Perineal: 2nd  Yes    Periurethral:       Labial:       Sulcus:       Vaginal:       Cervical:         Suture used for repair: 2-0 Vicryl Rapide    Estimated Blood Loss:  100 cc     Quantitative Blood Loss:  Pending, see nurse entry        Complications     none    Disposition     Mother to Mother Baby/Postpartum  in stable condition currently.  Baby to remains with mom  in stable condition currently.    Hugo Richards MD  02/04/23  22:29 EST

## 2023-02-05 NOTE — LACTATION NOTE
P1 37w1d baby girl . SGA and re-warmed in nursery x 1. Patient said baby latched well to right breast but will show no interest to left. Enc her to nurse q 2 hours on right breast and use hand pump for left feeding any and all milk to baby. Watching blood sugars but no crises as yet. Enc patient to place baby S2S with no fabric between mom and baby and pile on the blankets on the outside. Patient encouraged to call LC when baby alert and cueing to attempt latch to left breast. Maternal hydration encouraged.

## 2023-02-06 VITALS
HEART RATE: 80 BPM | DIASTOLIC BLOOD PRESSURE: 75 MMHG | TEMPERATURE: 97.9 F | RESPIRATION RATE: 16 BRPM | BODY MASS INDEX: 31.22 KG/M2 | WEIGHT: 159 LBS | SYSTOLIC BLOOD PRESSURE: 122 MMHG | OXYGEN SATURATION: 100 % | HEIGHT: 60 IN

## 2023-02-06 PROBLEM — Z34.00 SUPERVISION OF NORMAL FIRST PREGNANCY, ANTEPARTUM: Status: RESOLVED | Noted: 2022-07-01 | Resolved: 2023-02-06

## 2023-02-06 RX ORDER — PSEUDOEPHEDRINE HCL 30 MG
100 TABLET ORAL 2 TIMES DAILY
Qty: 60 CAPSULE | Refills: 0 | Status: SHIPPED | OUTPATIENT
Start: 2023-02-06 | End: 2023-03-17

## 2023-02-06 RX ORDER — HYDROCODONE BITARTRATE AND ACETAMINOPHEN 5; 325 MG/1; MG/1
1 TABLET ORAL EVERY 4 HOURS PRN
Qty: 6 TABLET | Refills: 0 | Status: SHIPPED | OUTPATIENT
Start: 2023-02-06 | End: 2023-02-10

## 2023-02-06 RX ORDER — IBUPROFEN 600 MG/1
600 TABLET ORAL EVERY 8 HOURS PRN
Qty: 40 TABLET | Refills: 1 | Status: SHIPPED | OUTPATIENT
Start: 2023-02-06

## 2023-02-06 RX ADMIN — DOCUSATE SODIUM 100 MG: 100 CAPSULE, LIQUID FILLED ORAL at 08:13

## 2023-02-06 RX ADMIN — ACETAMINOPHEN 1000 MG: 500 TABLET, FILM COATED ORAL at 08:17

## 2023-02-06 RX ADMIN — FERROUS SULFATE TAB 325 MG (65 MG ELEMENTAL FE) 325 MG: 325 (65 FE) TAB at 08:13

## 2023-02-06 RX ADMIN — IBUPROFEN 600 MG: 600 TABLET, FILM COATED ORAL at 02:55

## 2023-02-06 RX ADMIN — HYDROCODONE BITARTRATE AND ACETAMINOPHEN 1 TABLET: 5; 325 TABLET ORAL at 12:43

## 2023-02-06 RX ADMIN — Medication 1 TABLET: at 08:13

## 2023-02-06 NOTE — PLAN OF CARE
Goal Outcome Evaluation:  Plan of Care Reviewed With: patient           Outcome Evaluation: Stable, pain well controlled with po pain meds, using ice packs and waffle cushion, up ambulating in room without any problems, voiding WNL, using hand pump at BS and encouraged to pump after every feeding.

## 2023-02-06 NOTE — PROGRESS NOTES
Postpartum Progress Note      Status post Vaginal Delivery: Doing well postoperatively.     1) postpartum care immediately following delivery :    - Patient meeting postpartum milestones.    - Will discharge home today with discharge precautions and instructions reviewed.   - F/u in 1 week scheduled for BP check.     2) Gestational hypertension   - Blood pressures remain normotensive on no medications and she is asymptomatic.   - Patient scheduled for 1 week follow up for BP check.     3) Postpartum anemia   - PO iron ordered.   - Remains asymptomatic.     Rh status: AB negative, Baby A positive. Received Rhogam on 2/5/23.   Rubella: Immune  Gender: Female infant   Hgb 10.3-->9.7-->9.5     Subjective     Postpartum Day 2: Vaginal delivery    The patient feels tired. The patient denies emotional concerns. Pain is well controlled with current medications. The baby is well. The patient is ambulating well. The patient is tolerating a normal diet. She reports vaginal bleeding is appropriate. She is voiding without difficulty.     Objective     Vital signs in last 24 hours:  Temp:  [97.3 °F (36.3 °C)-98.7 °F (37.1 °C)] 97.3 °F (36.3 °C)  Heart Rate:  [] 78  Resp:  [16-18] 16  BP: (121-139)/(75-88) 127/75      General:    alert, appears stated age and cooperative   Lungs:  no increased work of breathing, regular respirations    Abdomen:  Soft, Non-tender    Lochia:  appropriate   Uterine Fundus:   firm   Ext    Trace lower extremity edema    DVT Evaluation:  No cords or calf tenderness.     Lab Results   Component Value Date    WBC 17.51 (H) 02/05/2023    HGB 9.5 (L) 02/05/2023    HCT 28.6 (L) 02/05/2023    MCV 92.3 02/05/2023     (L) 02/05/2023       Andreia Barnes MD  2/6/2023  09:27 EST

## 2023-02-06 NOTE — LACTATION NOTE
This note was copied from a baby's chart.  P1 37w2d baby. Baby getting formula every 3hrs, up to 20mls now. Mom is trying to latch baby and she is also pumping with HGP, getting >1ml. Prescription faxed for Spectra pump and pump was given. Encouraged pumping every 3hrs and call for any assistance.

## 2023-02-06 NOTE — PROGRESS NOTES
Discharge Planning Assessment  [facility]     Patient Name: Odette Gar  MRN: [id number]  Today's Date: 2/6/2023    Admit Date: 2/3/2023    Plan: Infant may discharge to mother when medically ready; CSW will follow cord. CASSANDRA Chavez.   Discharge Needs Assessment    No documentation.                Discharge Plan     Row Name 02/06/23 1251       Plan    Plan Infant may discharge to mother when medically ready; CSW will follow cord. CASSANDRA Chavez.    Plan Comments Mother: Odette Gar, MRN: [id number]; infant: David “Gurjit” Cong, MRN: [id number]. CSW was consulted for “positive THC on admit.” Of note, mother’s UDS was positive for THC prenatally on 7/1/22; mother’s UDS was positive for THC on admit. Infant’s UDS was missed; cord toxicology sent. CSW met with mother at bedside while father of infant/ significant other sat on mother’s bed. Mother gave consent for father to be present during assessment. Mother verified address, phone number, and insurance. Mother confirmed [employer] had spoken to her about adding infant to health insurance. Mother reports having car seat, crib, clothes, and diapers for infant. This is mother and father’s first baby. Mother reports, maternal grandparents, paternal grandparents, maternal siblings, paternal siblings, friends, and other family members are available for support as needed. Mother reports infant is following up at [facility] after discharge; mother is comfortable scheduling appointments for infant and has transportation. Mother is current with WIC and plans to add infant onto her plan following discharge. CSW discussed mother’s UDS and infant’s cord toxicology, as well as mandated reporting to CPS if infant’s cord toxicology is positive for THC or other substances; Mother voiced understanding. CSW provided mother with a packet of resources including: WIC, HANDS, childcare assistance resources, transportation, infant supplies, counseling, online  support groups, postpartum mood and anxiety resources, and general community resources. CSW spent time building rapport with mother, and offered validation, support, and encouragement to mother throughout assessment. Mother and father were polite and appropriate, and denied having unmet needs or concerns at this time. Mother held and interacted appropriately with infant throughout assessment. CSW will follow cord toxicology and report to CPS if warranted. YOLIE ChavezW.              Continued Care and Services - Admitted Since 2/3/2023    Coordination has not been started for this encounter.       Expected Discharge Date and Time     Expected Discharge Date Expected Discharge Time    Feb 6, 2023          Demographic Summary     Row Name 02/06/23 1249       General Information    Admission Type inpatient    Arrived From home    Referral Source nursing    Reason for Consult substance use concerns    General Information Comments Positive THC on admit               Functional Status     Row Name 02/06/23 1249       Mental Status    General Appearance WDL WDL       Mental Status Summary    Recent Changes in Mental Status/Cognitive Functioning no changes       Employment/    Employment Status employed part-time    Employment/ Comments [employer]     Row Name 02/06/23 1249       Behavior WDL    Behavior WDL WDL       Emotion Mood WDL    Emotion/Mood/Affect WDL WDL       Speech WDL    Speech WDL WDL       Perceptual State WDL    Perceptual State WDL WDL       Thought Process WDL    Thought Process WDL WDL       Intellectual Performance WDL    Intellectual Performance WDL WDL       Coping/Stress    Major Change/Loss/Stressor birth    Patient Personal Strengths future/goal oriented;motivated;positive attitude;strong support system;resilient    Sources of Support other family members;parent(s);sibling(s);friend(s);significant other               Abuse/Neglect     Row Name 02/06/23  1250       Personal Safety    Physical Signs of Abuse Present no               Legal    No documentation.                Substance Abuse     Row Name 02/06/23 1250       Substance Use    Substance Use Comment Mother’s UDS pos for THC on 7/1/22; mother’s UDS  pos for THC on admit. No UDS infant; cord tox sent               Patient Forms    No documentation.                   MARLENE Cole

## 2023-02-06 NOTE — NURSING NOTE
Started Mom on hospital grade pump and educated her on using pump and cleaning pump parts. Parents verbalized understanding.

## 2023-02-07 LAB — BUPRENORPHINE UR QL: NEGATIVE NG/ML

## 2023-02-08 ENCOUNTER — TELEPHONE (OUTPATIENT)
Dept: OBSTETRICS AND GYNECOLOGY | Facility: CLINIC | Age: 23
End: 2023-02-08
Payer: COMMERCIAL

## 2023-02-08 NOTE — TELEPHONE ENCOUNTER
Pt called about her appt on the 10th, she said she was told by you to keep the postpartum appt for blood pressure check. She wants to make sure what shes exactly coming in for.

## 2023-02-10 ENCOUNTER — POSTPARTUM VISIT (OUTPATIENT)
Dept: OBSTETRICS AND GYNECOLOGY | Facility: CLINIC | Age: 23
End: 2023-02-10
Payer: COMMERCIAL

## 2023-02-10 VITALS
DIASTOLIC BLOOD PRESSURE: 95 MMHG | HEART RATE: 79 BPM | SYSTOLIC BLOOD PRESSURE: 143 MMHG | WEIGHT: 147 LBS | BODY MASS INDEX: 28.86 KG/M2 | HEIGHT: 60 IN

## 2023-02-10 DIAGNOSIS — O13.3 GESTATIONAL HYPERTENSION, THIRD TRIMESTER: ICD-10-CM

## 2023-02-10 DIAGNOSIS — F39 MOOD DISORDER: ICD-10-CM

## 2023-02-10 PROCEDURE — 0503F POSTPARTUM CARE VISIT: CPT | Performed by: NURSE PRACTITIONER

## 2023-02-10 NOTE — PROGRESS NOTES
Continued Stay Note  [facility]     Patient Name: Odette Gar  MRN: [id number]  Today's Date: 2/10/2023    Admit Date: 2/3/2023    Plan: Infant may discharge to mother when medically ready; CSW will follow cord. CASSANDRA Chavez.   Discharge Plan     Row Name 02/10/23 1423       Plan    Plan Comments Mother: Odette Gar, MRN: [id number]; infant: David “Gurjit” Cong, MRN: [id number]. CSW has reviewed infant’s cord toxicology results and infant’s cord was negative for substances. Mandated CPS reporting is not required at this time. Mary TRUJILLO.               Discharge Codes    No documentation.               Expected Discharge Date and Time     Expected Discharge Date Expected Discharge Time    Feb 6, 2023             MARLENE Cole

## 2023-02-10 NOTE — PROGRESS NOTES
"Chief Complaint   Patient presents with   • Postpartum Care     Pt s/p  23        SUBJECTIVE:   Odette Gar is a 22 y.o.  who presents s/p  Spontaneous Vaginal Delivery on 23. Her pregnancy was complicated by GHTN. She did not require medications to treat. Denies HA, vision changes, or RUQ pain. Denies mood changes, depression or anxiety.     Bowel and bladder function have returned to normal  Bleeding scant    Past Medical History:   Diagnosis Date   • Pelvic fracture (HCC)     See at [facility]    • Preeclampsia      Past Surgical History:   Procedure Laterality Date   • KNEE ACL RECONSTRUCTION Left      Social History     Tobacco Use   • Smoking status: Never   Vaping Use   • Vaping Use: Never used   Substance Use Topics   • Alcohol use: Not Currently   • Drug use: Not Currently     Types: Marijuana     Comment: stopped 22     Family History   Problem Relation Age of Onset   • Breast cancer Paternal Grandmother 40   • Uterine cancer Maternal Great-Grandmother    • Ovarian cancer Maternal Great-Grandmother    • Lung cancer Maternal Great-Grandmother        Patient Active Problem List   Diagnosis   • Mood disorder (HCC)   • Pelvic Rami Fracture 2021, healed   • Hyperemesis gravidarum   • Fibroadenoma of breast, unspecified laterality   • Gestational hypertension   •  (spontaneous vaginal delivery)        OBJECTIVE:   /95   Pulse 79   Ht 152.4 cm (60\")   Wt 66.7 kg (147 lb)   LMP 2022   Breastfeeding Yes Comment: breast & bottle feeding  BMI 28.71 kg/m²      Physical Exam  Constitutional:       General: She is not in acute distress.     Appearance: Normal appearance. She is not ill-appearing or toxic-appearing.   Cardiovascular:      Rate and Rhythm: Normal rate.      Pulses: Normal pulses.   Abdominal:      General: There is no distension.      Palpations: Abdomen is soft. There is no mass.      Tenderness: There is no abdominal tenderness. There is no guarding.    "   Hernia: No hernia is present.   Musculoskeletal:         General: No swelling. Normal range of motion.      Cervical back: Normal range of motion.      Right lower leg: No edema.      Left lower leg: No edema.      Comments: +1 DTR, no clonus   Neurological:      General: No focal deficit present.      Mental Status: She is alert and oriented to person, place, and time.      Cranial Nerves: No cranial nerve deficit.   Skin:     General: Skin is warm and dry.   Psychiatric:         Mood and Affect: Mood normal.         Behavior: Behavior normal.         Thought Content: Thought content normal.         Judgment: Judgment normal.   Vitals and nursing note reviewed.         Lab Results   Component Value Date    WBC 17.51 (H) 2023    HGB 9.5 (L) 2023    HCT 28.6 (L) 2023    MCV 92.3 2023     (L) 2023        ASSESSMENT:   Diagnoses and all orders for this visit:    1. Postpartum follow-up (Primary)    2. Gestational hypertension, third trimester    3.  (spontaneous vaginal delivery)    4. Mood disorder (HCC)        PLAN:   Doing very well postpartum  Baby is doing well  Repeat BP with manual cuff 140/78. Reviewed preeclampsia precautions with pt. Reviewed labs, PLT and LFT normal range.   At this time, continue pelvic rest and lifting precautions.  Mood stable at this time, call with any changes    Return in about 5 weeks (around 3/17/2023) for PP f/u with Bryon or PAPI, ROSELINE.    I spent 22 minutes caring for Odette on this date of service. This time includes time spent by me in the following activities: preparing for the visit, reviewing tests, obtaining and/or reviewing a separately obtained history, performing a medically appropriate examination and/or evaluation, counseling and educating the patient/family/caregiver, ordering medications, tests, or procedures, referring and communicating with other health care professionals and documenting information in the medical  record    Julia Schwarz, APRN  2/10/2023  12:15 EST

## 2023-02-11 LAB
CANNABINOIDS UR QL CFM: NEGATIVE
LABORATORY COMMENT REPORT: NORMAL

## 2023-03-17 ENCOUNTER — POSTPARTUM VISIT (OUTPATIENT)
Dept: OBSTETRICS AND GYNECOLOGY | Facility: CLINIC | Age: 23
End: 2023-03-17
Payer: COMMERCIAL

## 2023-03-17 VITALS
HEART RATE: 79 BPM | WEIGHT: 140.6 LBS | DIASTOLIC BLOOD PRESSURE: 72 MMHG | BODY MASS INDEX: 27.61 KG/M2 | HEIGHT: 60 IN | SYSTOLIC BLOOD PRESSURE: 115 MMHG

## 2023-03-17 DIAGNOSIS — O13.3 GESTATIONAL HYPERTENSION, THIRD TRIMESTER: ICD-10-CM

## 2023-03-17 DIAGNOSIS — R10.2 VAGINAL PAIN: ICD-10-CM

## 2023-03-17 DIAGNOSIS — Z30.017 NEXPLANON INSERTION: ICD-10-CM

## 2023-03-17 DIAGNOSIS — Z12.4 SCREENING FOR CERVICAL CANCER: ICD-10-CM

## 2023-03-17 LAB
B-HCG UR QL: NEGATIVE
EXPIRATION DATE: NORMAL
INTERNAL NEGATIVE CONTROL: NEGATIVE
INTERNAL POSITIVE CONTROL: POSITIVE
Lab: NORMAL

## 2023-03-17 PROCEDURE — 81025 URINE PREGNANCY TEST: CPT | Performed by: NURSE PRACTITIONER

## 2023-03-17 PROCEDURE — 99213 OFFICE O/P EST LOW 20 MIN: CPT | Performed by: NURSE PRACTITIONER

## 2023-03-17 PROCEDURE — 11981 INSERTION DRUG DLVR IMPLANT: CPT | Performed by: NURSE PRACTITIONER

## 2023-03-17 RX ORDER — ESTRADIOL 0.1 MG/G
2 CREAM VAGINAL DAILY
Qty: 42.5 G | Refills: 0 | Status: SHIPPED | OUTPATIENT
Start: 2023-03-17 | End: 2023-03-27

## 2023-03-17 NOTE — PROGRESS NOTES
"Chief Complaint   Patient presents with   • Postpartum Care     Pt s/p  23    • Procedure     Nexplanon insertion        SUBJECTIVE:   Odette Gar is a 22 y.o.  who presents s/p Spontaneous Vaginal Delivery on 23. Her pregnancy was complicated by gestational HTN, this did not require any medications to treat. She is planning nexplanon insertion today. She has not returned to menses or intercourse since delivery. She c/o vaginal tenderness since delivery.     Needs rpt breast imaging. States prior appointment was scheduled for the day she delivered.     Bowel and bladder function have returned to normal  Mood changes denies  bottle feeding    Past Medical History:   Diagnosis Date   • Pelvic fracture (HCC)     See at [facility]    • Preeclampsia      Past Surgical History:   Procedure Laterality Date   • KNEE ACL RECONSTRUCTION Left      Social History     Tobacco Use   • Smoking status: Never   Vaping Use   • Vaping Use: Never used   Substance Use Topics   • Alcohol use: Not Currently   • Drug use: Not Currently     Types: Marijuana     Comment: stopped 22     Family History   Problem Relation Age of Onset   • Breast cancer Paternal Grandmother 40   • Uterine cancer Maternal Great-Grandmother    • Ovarian cancer Maternal Great-Grandmother    • Lung cancer Maternal Great-Grandmother        Patient Active Problem List   Diagnosis   • Mood disorder (HCC)   • Pelvic Rami Fracture 2021, healed   • Hyperemesis gravidarum   • Fibroadenoma of breast, unspecified laterality   • Gestational hypertension   •  (spontaneous vaginal delivery)        OBJECTIVE:   /72   Pulse 79   Ht 152.4 cm (60\")   Wt 63.8 kg (140 lb 9.6 oz)   Breastfeeding No   BMI 27.46 kg/m²        Physical Exam  Constitutional:       General: She is not in acute distress.     Appearance: Normal appearance. She is not ill-appearing, toxic-appearing or diaphoretic.   Genitourinary:      Bladder and urethral meatus " normal.      No lesions in the vagina.      Right Labia: No rash, tenderness, lesions, skin changes or Bartholin's cyst.     Left Labia: No tenderness, lesions, skin changes, Bartholin's cyst or rash.     No labial fusion noted.      No inguinal adenopathy present in the right or left side.     No vaginal discharge, erythema, tenderness, bleeding, ulceration or granulation tissue.      No vaginal prolapse present.     No vaginal atrophy present.       Right Adnexa: not tender, not full, not palpable, no mass present and not absent.     Left Adnexa: not tender, not full, not palpable, no mass present and not absent.     No cervical motion tenderness, discharge, friability, lesion, polyp, nabothian cyst or eversion.      Uterus is not enlarged, fixed, tender, irregular or prolapsed.      No uterine mass detected.  Cardiovascular:      Rate and Rhythm: Normal rate.   Pulmonary:      Effort: Pulmonary effort is normal.   Abdominal:      General: There is no distension.      Palpations: Abdomen is soft. There is no mass.      Tenderness: There is no abdominal tenderness. There is no guarding.      Hernia: No hernia is present. There is no hernia in the left inguinal area or right inguinal area.   Musculoskeletal:         General: Normal range of motion.      Cervical back: Normal range of motion.   Lymphadenopathy:      Lower Body: No right inguinal adenopathy. No left inguinal adenopathy.   Neurological:      General: No focal deficit present.      Mental Status: She is alert and oriented to person, place, and time.      Cranial Nerves: No cranial nerve deficit.   Skin:     General: Skin is warm and dry.   Psychiatric:         Mood and Affect: Mood normal.         Behavior: Behavior normal.         Thought Content: Thought content normal.         Judgment: Judgment normal.   Vitals and nursing note reviewed.           Lab Results   Component Value Date    WBC 17.51 (H) 02/05/2023    HGB 9.5 (L) 02/05/2023    HCT 28.6  (L) 02/05/2023    MCV 92.3 02/05/2023     (L) 02/05/2023      Nexplanon Insertion:    Chief Complaint: Nexplanon Insertion  LNMP:5/2022  UPT: negative  Lot #:[id number]   Expiration date:4/9/2025    Procedures      Pre Dx: 1) Nexplanon Insertion   Post Dx: 1) Nexplanon Insertion     Risks, benefits, alternatives explained. All questions answered. Consents signed.  The area for insertion prepped with betadine in a sterile fashion. About 3 mL of 1% lidocaine.     The insertion site was identified approximately 6-8 cm proximal to the left elbow crease in the underside of the upper arm overlying the groove between the biceps and triceps muscles. The skin at the insertion site was stretched by my thumb and index finger. Then inserted the needle tip, bevel side up, at an angle not greater than 20° to the skin surface, just until the skin was penetrated. The applicator was then lowered so that it was parallel to the arm. To minimize the chance of deep incision, the skin was tented upwards with the tip of the needle. The needle was then gently inserted to the full length. Then fixed the device in place on the arm with one hand. I then slowly and carefully retracted the needle cannula back along the length of the device after pushing the release button. The obturator was seen in the device to determine that the nexplanon had been released.     Both the patient and I were easily able to feel the device under the skin. Steri-Strips were applied at the site, and the arm was gently wrapped with gauze. Pt instructed to keep bandage on for 12-24 hrs.    There were no complications.   The patient tolerated the procedure well.     She was given a reminder card. She was advised to use condoms as a backup method for at least a week after insertion.    Expectations, warning signs and limitations reviewed.       ASSESSMENT:  Diagnoses and all orders for this visit:    1. Postpartum follow-up (Primary)    2. Nexplanon insertion  -      POC Pregnancy, Urine    3. Screening for cervical cancer  -     IGP,CtNgTv,rfx Apt HPV All    4. Vaginal pain  -     estradiol (ESTRACE VAGINAL) 0.1 MG/GM vaginal cream; Insert 2 g into the vagina Daily for 10 days.  Dispense: 42.5 g; Refill: 0    5. Gestational hypertension, third trimester         PLAN:   Doing very well postpartum  BP normal range  Baby is doing well  Contraception: nexplanon inserted today. Advised condoms for first 3 weeks of use as back up  She is having vaginal discomfort, tenderness with atraumatic introduction of speculum. Will trial vaginal estrace X10 days. Encouraged lubricants with IC  At this time, may resume normal activity including intercourse and lifting.  Reviewed normal precautions.  Reviewed last pap smear, no results noted, collected today  Recommended follow up for annual exam or sooner with problems    Return in about 1 year (around 3/17/2024) for Annual physical.    Julia Schwarz, APRN  3/17/2023  12:08 EDT

## 2023-03-23 ENCOUNTER — TELEPHONE (OUTPATIENT)
Dept: OBSTETRICS AND GYNECOLOGY | Facility: CLINIC | Age: 23
End: 2023-03-23
Payer: COMMERCIAL

## 2023-03-23 LAB
C TRACH RRNA CVX QL NAA+PROBE: NEGATIVE
CONV .: NORMAL
CYTOLOGIST CVX/VAG CYTO: NORMAL
CYTOLOGY CVX/VAG DOC CYTO: NORMAL
CYTOLOGY CVX/VAG DOC THIN PREP: NORMAL
DX ICD CODE: NORMAL
HIV 1 & 2 AB SER-IMP: NORMAL
N GONORRHOEA RRNA CVX QL NAA+PROBE: NEGATIVE
OTHER STN SPEC: NORMAL
STAT OF ADQ CVX/VAG CYTO-IMP: NORMAL
T VAGINALIS RRNA SPEC QL NAA+PROBE: NEGATIVE

## 2023-03-23 NOTE — TELEPHONE ENCOUNTER
Was able to reschedule Breast Ultrasound at [facility] for 4/4/23 @115pm.  Pt is aware.

## 2023-04-04 ENCOUNTER — APPOINTMENT (OUTPATIENT)
Dept: WOMENS IMAGING | Facility: HOSPITAL | Age: 23
End: 2023-04-04
Payer: COMMERCIAL

## 2023-04-04 PROCEDURE — 76642 ULTRASOUND BREAST LIMITED: CPT | Performed by: RADIOLOGY

## 2023-04-05 DIAGNOSIS — R92.8 ABNORMAL FINDING ON BREAST IMAGING: ICD-10-CM

## 2023-04-10 ENCOUNTER — TELEPHONE (OUTPATIENT)
Dept: OBSTETRICS AND GYNECOLOGY | Facility: CLINIC | Age: 23
End: 2023-04-10
Payer: COMMERCIAL

## 2023-04-10 NOTE — TELEPHONE ENCOUNTER
Spoke with Odette to let her know that Dr Conley is covering for Dr Slater test results. [facility] follow up ultrasound, radiology said everything is stable. Recommend Mx at age 40. Clinical exam in the office now every 1 to 3 years. Thank you.

## 2023-04-10 NOTE — PROGRESS NOTES
Dr. Bryon Astudillo patient. Follow up breast ultrasound. Per radiology - everything stable. Recommend MMG at 40 years of age. Clinical exam now every 1-3 years. Please let her know. Thanks, Dr. Conley

## 2023-04-10 NOTE — TELEPHONE ENCOUNTER
----- Message from Sergey Conley MD sent at 4/10/2023 10:23 AM EDT -----  Dr. Bryon Astudillo patient. Follow up breast ultrasound. Per radiology - everything stable. Recommend MMG at 40 years of age. Clinical exam now every 1-3 years. Please let her know. Thanks, Dr. Conley

## 2024-03-19 ENCOUNTER — OFFICE VISIT (OUTPATIENT)
Dept: OBSTETRICS AND GYNECOLOGY | Facility: CLINIC | Age: 24
End: 2024-03-19
Payer: COMMERCIAL

## 2024-03-19 VITALS
HEART RATE: 75 BPM | BODY MASS INDEX: 25.68 KG/M2 | HEIGHT: 60 IN | SYSTOLIC BLOOD PRESSURE: 116 MMHG | WEIGHT: 130.8 LBS | DIASTOLIC BLOOD PRESSURE: 74 MMHG

## 2024-03-19 DIAGNOSIS — N93.9 ABNORMAL UTERINE BLEEDING (AUB): ICD-10-CM

## 2024-03-19 DIAGNOSIS — Z01.419 WELL WOMAN EXAM WITH ROUTINE GYNECOLOGICAL EXAM: Primary | ICD-10-CM

## 2024-03-19 LAB
ERYTHROCYTE [DISTWIDTH] IN BLOOD BY AUTOMATED COUNT: 11.8 % (ref 12.3–15.4)
HCT VFR BLD AUTO: 37.4 % (ref 34–46.6)
HGB BLD-MCNC: 12.9 G/DL (ref 12–15.9)
MCH RBC QN AUTO: 30.5 PG (ref 26.6–33)
MCHC RBC AUTO-ENTMCNC: 34.5 G/DL (ref 31.5–35.7)
MCV RBC AUTO: 88.4 FL (ref 79–97)
PLATELET # BLD AUTO: 221 10*3/MM3 (ref 140–450)
RBC # BLD AUTO: 4.23 10*6/MM3 (ref 3.77–5.28)
TSH SERPL DL<=0.005 MIU/L-ACNC: 2.64 UIU/ML (ref 0.27–4.2)
WBC # BLD AUTO: 7.95 10*3/MM3 (ref 3.4–10.8)

## 2024-03-19 PROCEDURE — 2014F MENTAL STATUS ASSESS: CPT | Performed by: OBSTETRICS & GYNECOLOGY

## 2024-03-19 PROCEDURE — 1159F MED LIST DOCD IN RCRD: CPT | Performed by: OBSTETRICS & GYNECOLOGY

## 2024-03-19 PROCEDURE — 99459 PELVIC EXAMINATION: CPT | Performed by: OBSTETRICS & GYNECOLOGY

## 2024-03-19 PROCEDURE — 1160F RVW MEDS BY RX/DR IN RCRD: CPT | Performed by: OBSTETRICS & GYNECOLOGY

## 2024-03-19 PROCEDURE — 99395 PREV VISIT EST AGE 18-39: CPT | Performed by: OBSTETRICS & GYNECOLOGY

## 2024-03-19 RX ORDER — ETONOGESTREL 68 MG/1
1 IMPLANT SUBCUTANEOUS ONCE
COMMUNITY

## 2024-03-19 RX ORDER — NORGESTIMATE AND ETHINYL ESTRADIOL 0.25-0.035
1 KIT ORAL DAILY
Qty: 84 TABLET | Refills: 0 | Status: SHIPPED | OUTPATIENT
Start: 2024-03-19

## 2024-03-19 RX ORDER — DOCUSATE SODIUM 100 MG/1
100 CAPSULE, LIQUID FILLED ORAL 2 TIMES DAILY
Qty: 60 CAPSULE | Refills: 1 | Status: SHIPPED | OUTPATIENT
Start: 2024-03-19

## 2024-03-19 RX ORDER — MULTIPLE VITAMINS W/ MINERALS TAB 9MG-400MCG
1 TAB ORAL DAILY
COMMUNITY

## 2024-03-19 RX ORDER — HYDROCORTISONE 25 MG/G
CREAM TOPICAL 2 TIMES DAILY
Qty: 28 G | Refills: 0 | Status: SHIPPED | OUTPATIENT
Start: 2024-03-19

## 2024-03-19 NOTE — PROGRESS NOTES
Chief Complaint   Patient presents with    Annual Exam     Pp visit: 3/17/2023 , pap NIL HPV-  Low sex drive since after giving birth    Menstrual Problem     Having periods 3 months at a time and then nothing for 2 weeks. Has happened ever since Nexplanon was inserted         Odette Gar is a 23 y.o.  who presents for an annual examination     Pap history:  2023 NIL  Prior abnormal paps: no  STDs  Sexually active: yes  History of STDs: yes, chlamydia  Has had HPV vaccine: believes she had it  Contraception:  Nexplanon placed 2023  Reports she bleeds for three months - pretty heavy.  She then may have 1-2 weeks of no bleeding and then back to bleeding.      Breast imaging following delivery was normal  2023 in [place]    Screening for BRCA-   Is patient's family history significant for BRCA risk factors? yes, thinks she got with Pediatrician office    Past Medical History:   Diagnosis Date    Pelvic fracture     See at [facility]     Preeclampsia      Past Surgical History:   Procedure Laterality Date    KNEE ACL RECONSTRUCTION Left      OB History    Para Term  AB Living   1 1 1     1   SAB IAB Ectopic Molar Multiple Live Births           0 1      # Outcome Date GA Lbr Mitch/2nd Weight Sex Type Anes PTL Lv   1 Term 23 37w2d 08:27 / 01:11 2570 g (5 lb 10.7 oz) F Vag-Spont EPI N EVELYN      Birth Comments: LDR1 Panda      Social History     Tobacco Use    Smoking status: Never    Smokeless tobacco: Never   Vaping Use    Vaping status: Never Used   Substance Use Topics    Alcohol use: Yes     Comment: occa    Drug use: Yes     Types: Marijuana     Family History   Problem Relation Age of Onset    Breast cancer Paternal Grandmother 40        pt reports she had mastectomy in mid- 20s    Breast cancer Maternal Great-Grandmother     Lung cancer Maternal Great-Grandmother     Colon cancer Neg Hx     Uterine cancer Neg Hx      Current Outpatient Medications on File Prior to Visit  "  Medication Sig Dispense Refill    Etonogestrel (Nexplanon) 68 MG implant subdermal implant Inject 1 each into the appropriate area of the skin as directed by provider 1 (One) Time.      ibuprofen (ADVIL,MOTRIN) 600 MG tablet Take 1 tablet by mouth Every 8 (Eight) Hours As Needed for Mild Pain. 40 tablet 1    multivitamin with minerals tablet tablet Take 1 tablet by mouth Daily.      PRENATAL GUMMY VITAMIN Chew 1 each Daily. (Patient not taking: Reported on 3/19/2024)       No current facility-administered medications on file prior to visit.     No Known Allergies     Review of Systems  See HPI      OBJECTIVE:   Vitals:    03/19/24 1022   BP: 116/74   Pulse: 75   Weight: 59.3 kg (130 lb 12.8 oz)   Height: 152.4 cm (60\")      Physical Exam  Exam conducted with a chaperone present.   Constitutional:       General: She is not in acute distress.     Appearance: She is well-developed. She is not diaphoretic.   HENT:      Head: Normocephalic and atraumatic.   Neck:      Thyroid: No thyromegaly.      Trachea: No tracheal deviation.   Cardiovascular:      Rate and Rhythm: Normal rate.      Heart sounds: Normal heart sounds. No murmur heard.     No friction rub. No gallop.   Pulmonary:      Effort: Pulmonary effort is normal. No respiratory distress.      Breath sounds: Normal breath sounds.   Chest:      Chest wall: No tenderness.   Breasts:     Right: No inverted nipple, mass, nipple discharge, skin change or tenderness.      Left: No inverted nipple, mass, nipple discharge, skin change or tenderness.   Abdominal:      General: There is no distension.      Palpations: Abdomen is soft. There is no mass.      Tenderness: There is no abdominal tenderness.   Genitourinary:     General: Normal vulva.      Labia:         Right: No rash, lesion or injury.         Left: No rash, lesion or injury.       Vagina: No vaginal discharge, tenderness or bleeding.      Cervix: No cervical motion tenderness, discharge or friability.      " Uterus: Not deviated, not enlarged, not fixed and not tender.       Adnexa:         Right: No mass, tenderness or fullness.          Left: No mass, tenderness or fullness.        Comments: Small hemorrhoid at 12 o'clock  Musculoskeletal:         General: No deformity. Normal range of motion.   Lymphadenopathy:      Cervical: No cervical adenopathy.   Skin:     General: Skin is warm and dry.      Findings: No rash.   Neurological:      Mental Status: She is alert and oriented to person, place, and time.   Psychiatric:         Behavior: Behavior normal.         Thought Content: Thought content normal.         Judgment: Judgment normal.         ASSESSMENT/PLAN:   (Z01.419) Well woman exam with routine gynecological exam - Plan: Chlamydia trachomatis, Neisseria gonorrhoeae, Trichomonas vaginalis, PCR - Swab, Cervix, CBC (No Diff), TSH Rfx On Abnormal To Free T4    (N93.9) Abnormal uterine bleeding (AUB) - Plan: CBC (No Diff), TSH Rfx On Abnormal To Free T4      Annual well woman exam:  Cervical cancer screening:    Denies cervical dysplasia in past   The patient is due for a pap in 2026.   HPV vaccination believes was completed   Screening guidelines discussed with patient  Breast cancer screening:    Clinical breast exam recommended for age 20-39 years every 1-3 years   Mammogram recommended starting age 40; had negative breast imaging last year    Breast self awareness encouraged  STD Screening   Testing desires swab.    Contraception :   Nexplanon  Having AUB, would like to try a CHC for 3 months and no contraindication   Will follow up in 3 months if not improved. Will get labs to check for anemia and thyroid (mom has hypothyroidism)  Family history    Reports she had negative elmira testing- thinks may have been with a breast clinic  Healthy lifestyle counseling:   Hemorrhoid - Rx sent for stool softener, fiber, and topical steroid cream        Return in about 3 months (around 6/19/2024) for Recheck on  Nexplanon.

## 2024-03-25 ENCOUNTER — TELEPHONE (OUTPATIENT)
Dept: OBSTETRICS AND GYNECOLOGY | Facility: CLINIC | Age: 24
End: 2024-03-25
Payer: COMMERCIAL

## 2024-03-25 NOTE — TELEPHONE ENCOUNTER
----- Message from Gladis Slater MD sent at 3/22/2024  7:36 AM EDT -----  Please inform patient of negative gonorrhea/chlamydia/trichomonas testing

## 2024-06-19 ENCOUNTER — OFFICE VISIT (OUTPATIENT)
Dept: FAMILY MEDICINE CLINIC | Facility: CLINIC | Age: 24
End: 2024-06-19
Payer: COMMERCIAL

## 2024-06-19 VITALS
OXYGEN SATURATION: 100 % | HEART RATE: 73 BPM | BODY MASS INDEX: 24.81 KG/M2 | WEIGHT: 126.4 LBS | DIASTOLIC BLOOD PRESSURE: 72 MMHG | HEIGHT: 60 IN | SYSTOLIC BLOOD PRESSURE: 110 MMHG

## 2024-06-19 DIAGNOSIS — F41.0 GENERALIZED ANXIETY DISORDER WITH PANIC ATTACKS: ICD-10-CM

## 2024-06-19 DIAGNOSIS — F41.1 GENERALIZED ANXIETY DISORDER WITH PANIC ATTACKS: ICD-10-CM

## 2024-06-19 DIAGNOSIS — Z00.00 ANNUAL PHYSICAL EXAM: Primary | ICD-10-CM

## 2024-06-19 PROBLEM — O21.0 HYPEREMESIS GRAVIDARUM: Status: RESOLVED | Noted: 2022-07-09 | Resolved: 2024-06-19

## 2024-06-19 PROBLEM — S32.591D CLOSED FRACTURE OF MULTIPLE RAMI OF RIGHT PUBIS WITH ROUTINE HEALING, SUBSEQUENT ENCOUNTER: Status: RESOLVED | Noted: 2022-07-01 | Resolved: 2024-06-19

## 2024-06-19 PROBLEM — O13.9 GESTATIONAL HYPERTENSION: Status: RESOLVED | Noted: 2023-02-03 | Resolved: 2024-06-19

## 2024-06-19 PROCEDURE — 99395 PREV VISIT EST AGE 18-39: CPT

## 2024-06-19 RX ORDER — FLUOXETINE 10 MG/1
10 CAPSULE ORAL DAILY
Qty: 42 CAPSULE | Refills: 0 | Status: SHIPPED | OUTPATIENT
Start: 2024-06-19
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
## 2024-06-19 NOTE — PATIENT INSTRUCTIONS
Managing Anxiety, Adult  After being diagnosed with anxiety, you may be relieved to know why you have felt or behaved a certain way. You may also feel overwhelmed about the treatment ahead and what it will mean for your life. With care and support, you can manage your anxiety.  How to manage lifestyle changes  Understanding the difference between stress and anxiety  Although stress can play a role in anxiety, it is not the same as anxiety. Stress is your body's reaction to life changes and events, both good and bad. Stress is often caused by something external, such as a deadline, test, or competition. It normally goes away after the event has ended and will last just a few hours. But, stress can be ongoing and can lead to more than just stress.  Anxiety is caused by something internal, such as imagining a terrible outcome or worrying that something will go wrong that will greatly upset you. Anxiety often does not go away even after the event is over, and it can become a long-term (chronic) worry.  Lowering stress and anxiety  Talk with your health care provider or a counselor to learn more about lowering anxiety and stress. They may suggest tension-reduction techniques, such as:  Music. Spend time creating or listening to music that you enjoy and that inspires you.  Mindfulness-based meditation. Practice being aware of your normal breaths while not trying to control your breathing. It can be done while sitting or walking.  Centering prayer. Focus on a word, phrase, or sacred image that means something to you and brings you peace.  Deep breathing. Expand your stomach and inhale slowly through your nose. Hold your breath for 3-5 seconds. Then breathe out slowly, letting your stomach muscles relax.  Self-talk. Learn to notice and spot thought patterns that lead to anxiety reactions. Change those patterns to thoughts that feel peaceful.  Muscle relaxation. Take time to tense muscles and then relax them.  Choose a  tension-reduction technique that fits your lifestyle and personality. These techniques take time and practice. Set aside 5-15 minutes a day to do them. Specialized therapists can offer counseling and training in these techniques. The training to help with anxiety may be covered by some insurance plans.  Other things you can do to manage stress and anxiety include:  Keeping a stress diary. This can help you learn what triggers your reaction and then learn ways to manage your response.  Thinking about how you react to certain situations. You may not be able to control everything, but you can control your response.  Making time for activities that help you relax and not feeling guilty about spending your time in this way.  Doing visual imagery. This involves imagining or creating mental pictures to help you relax.  Practicing yoga. Through yoga poses, you can lower tension and relax.    Medicines  Medicines for anxiety include:  Antidepressant medicines. These are usually prescribed for long-term daily control.  Anti-anxiety medicines. These may be added in severe cases, especially when panic attacks occur.  When used together, medicines, psychotherapy, and tension-reduction techniques may be the most effective treatment.  Relationships  Relationships can play a big part in helping you recover. Spend more time connecting with trusted friends and family members. Think about going to couples counseling if you have a partner, taking family education classes, or going to family therapy. Therapy can help you and others better understand your anxiety.  How to recognize changes in your anxiety  Everyone responds differently to treatment for anxiety. Recovery from anxiety happens when symptoms lessen and stop interfering with your daily life at home or work. This may mean that you will start to:  Have better concentration and focus. Worry will interfere less in your daily thinking.  Sleep better.  Be less irritable.  Have more  energy.  Have improved memory.  Try to recognize when your condition is getting worse. Contact your provider if your symptoms interfere with home or work and you feel like your condition is not improving.  Follow these instructions at home:  Activity  Exercise. Adults should:  Exercise for at least 150 minutes each week. The exercise should increase your heart rate and make you sweat (moderate-intensity exercise).  Do strengthening exercises at least twice a week.  Get the right amount and quality of sleep. Most adults need 7-9 hours of sleep each night.  Lifestyle    Eat a healthy diet that includes plenty of vegetables, fruits, whole grains, low-fat dairy products, and lean protein.  Do not eat a lot of foods that are high in fats, added sugars, or salt (sodium).  Make choices that simplify your life.  Do not use any products that contain nicotine or tobacco. These products include cigarettes, chewing tobacco, and vaping devices, such as e-cigarettes. If you need help quitting, ask your provider.  Avoid caffeine, alcohol, and certain over-the-counter cold medicines. These may make you feel worse. Ask your pharmacist which medicines to avoid.  General instructions  Take over-the-counter and prescription medicines only as told by your provider.  Keep all follow-up visits. This is to make sure you are managing your anxiety well or if you need more support.  Where to find support  You can get help and support from:  Self-help groups.  Online and community organizations.  A trusted spiritual leader.  Couples counseling.  Family education classes.  Family therapy.  Where to find more information  You may find that joining a support group helps you deal with your anxiety. The following sources can help you find counselors or support groups near you:  Mental Health [place]: mentalhealth[place].net  Anxiety and Depression Association of [place] (ADAA): adaa.org  National [place] on Mental Illness ([place]): demar[place].org  Contact  a health care provider if:  You have a hard time staying focused or finishing tasks.  You spend many hours a day feeling worried about everyday life.  You are very tired because you cannot stop worrying.  You start to have headaches or often feel tense.  You have chronic nausea or diarrhea.  Get help right away if:  Your heart feels like it is racing.  You have shortness of breath.  You have thoughts of hurting yourself or others.  Get help right away if you feel like you may hurt yourself or others, or have thoughts about taking your own life. Go to your nearest emergency room or:  Call 911.  Call the National Suicide Prevention Lifeline at 1-638.471.7416 or 957. This is open 24 hours a day.  Text the Crisis Text Line at 009807.  This information is not intended to replace advice given to you by your health care provider. Make sure you discuss any questions you have with your health care provider.  Document Revised: 09/26/2023 Document Reviewed: 04/10/2022  Elsevier Patient Education © 2023 [employer]    Managing Depression, Adult  Depression is a mental health condition that affects your thoughts, feelings, and actions. Being diagnosed with depression can bring you relief if you did not know why you have felt or behaved a certain way. It could also leave you feeling overwhelmed. Finding ways to manage your symptoms can help you feel more positive about your future.  How to manage lifestyle changes  Being depressed is difficult. Depression can increase the level of everyday stress. Stress can make depression symptoms worse. You may believe your symptoms cannot be managed or will never improve. However, there are many things you can try to help manage your symptoms. There is hope.  Managing stress    Stress is your body's reaction to life changes and events, both good and bad. Stress can add to your feelings of depression. Learning to manage your stress can help lessen your feelings of depression.  Try some of  "the following approaches to reducing your stress (stress reduction techniques):  Listen to music that you enjoy and that inspires you.  Try using a meditation olinda or take a meditation class.  Develop a practice that helps you connect with your spiritual self. Walk in nature, pray, or go to a place of Christianity.  Practice deep breathing. To do this, inhale slowly through your nose. Pause at the top of your inhale for a few seconds and then exhale slowly, letting yourself relax. Repeat this three or four times.  Practice yoga to help relax and work your muscles.  Choose a stress reduction technique that works for you. These techniques take time and practice to develop. Set aside 5-15 minutes a day to do them. Therapists can offer training in these techniques. Do these things to help manage stress:  Keep a journal.  Know your limits. Set healthy boundaries for yourself and others, such as saying \"no\" when you think something is too much.  Pay attention to how you react to certain situations. You may not be able to control everything, but you can change your reaction.  Add humor to your life by watching funny movies or shows.  Make time for activities that you enjoy and that relax you.  Spend less time using electronics, especially at night before bed. The light from screens can make your brain think it is time to get up rather than go to bed.    Medicines  Medicines, such as antidepressants, are often a part of treatment for depression.  Talk with your pharmacist or health care provider about all the medicines, supplements, and herbal products that you take, their possible side effects, and what medicines and other products are safe to take together.  Make sure to report any side effects you may have to your health care provider.  Relationships  Your health care provider may suggest family therapy, couples therapy, or individual therapy as part of your treatment.  How to recognize changes  Everyone responds differently " to treatment for depression. As you recover from depression, you may start to:  Have more interest in doing activities.  Feel more hopeful.  Have more energy.  Eat a more regular amount of food.  Have better mental focus.  It is important to recognize if your depression is not getting better or is getting worse. The symptoms you had in the beginning may return, such as:  Feeling tired.  Eating too much or too little.  Sleeping too much or too little.  Feeling restless, agitated, or hopeless.  Trouble focusing or making decisions.  Having unexplained aches and pains.  Feeling irritable, angry, or aggressive.  If you or your family members notice these symptoms coming back, let your health care provider know right away.  Follow these instructions at home:  Activity  Try to get some form of exercise each day, such as walking.  Try yoga, mindfulness, or other stress reduction techniques.  Participate in group activities if you are able.  Lifestyle  Get enough sleep.  Cut down on or stop using caffeine, tobacco, alcohol, and any other harmful substances.  Eat a healthy diet that includes plenty of vegetables, fruits, whole grains, low-fat dairy products, and lean protein. Limit foods that are high in solid fats, added sugar, or salt (sodium).  General instructions  Take over-the-counter and prescription medicines only as told by your health care provider.  Keep all follow-up visits. It is important for your health care provider to check on your mood, behavior, and medicines. Your health care provider may need to make changes to your treatment.  Where to find support  Talking to others    Friends and family members can be sources of support and guidance. Talk to trusted friends or family members about your condition. Explain your symptoms and let them know that you are working with a health care provider to treat your depression. Tell friends and family how they can help.  Finances  Find mental health providers that fit  with your financial situation.  Talk with your health care provider if you are worried about access to food, housing, or medicine.  Call your insurance company to learn about your co-pays and prescription plan.  Where to find more information  You can find support in your area from:  Anxiety and Depression Association of [place] (ADAA): adaa.org  Mental Health [place]: mentalhealth[place].net  National [place] on Mental Illness: stiven.org  Contact a health care provider if:  You stop taking your antidepressant medicines, and you have any of these symptoms:  Nausea.  Headache.  Light-headedness.  Chills and body aches.  Not being able to sleep (insomnia).  You or your friends and family think your depression is getting worse.  Get help right away if:  You have thoughts of hurting yourself or others.  Get help right away if you feel like you may hurt yourself or others, or have thoughts about taking your own life. Go to your nearest emergency room or:  Call 911.  Call the National Suicide Prevention Lifeline at 1-274.479.9028 or 054. This is open 24 hours a day.  Text the Crisis Text Line at 587122.  This information is not intended to replace advice given to you by your health care provider. Make sure you discuss any questions you have with your health care provider.  Document Revised: 04/25/2023 Document Reviewed: 04/25/2023  Elsevier Patient Education © 2023 [employer]

## 2024-06-19 NOTE — PROGRESS NOTES
Ami Gar is a 24 y.o. female. Presents today for   Chief Complaint   Patient presents with    Establish Care    Anxiety       .  HPI     Past Medical History:   Diagnosis Date    Gestational hypertension 2023    Hyperemesis gravidarum 2022    Pelvic fracture     See at [facility]     Pelvic Rami Fracture 2021, healed 2022    Preeclampsia      (spontaneous vaginal delivery) 2023       Past Surgical History:   Procedure Laterality Date    KNEE ACL RECONSTRUCTION Left         No Known Allergies    Current Outpatient Medications on File Prior to Visit   Medication Sig Dispense Refill    Etonogestrel (Nexplanon) 68 MG implant subdermal implant Inject 1 each into the appropriate area of the skin as directed by provider 1 (One) Time.      multivitamin with minerals tablet tablet Take 1 tablet by mouth Daily.      [DISCONTINUED] docusate sodium (Colace) 100 MG capsule Take 1 capsule by mouth 2 (Two) Times a Day. (Patient not taking: Reported on 2024) 60 capsule 1    [DISCONTINUED] Hydrocortisone, Perianal, (Proctosol HC) 2.5 % rectal cream Insert  into the rectum 2 (Two) Times a Day. (Patient not taking: Reported on 2024) 28 g 0    [DISCONTINUED] ibuprofen (ADVIL,MOTRIN) 600 MG tablet Take 1 tablet by mouth Every 8 (Eight) Hours As Needed for Mild Pain. (Patient not taking: Reported on 2024) 40 tablet 1    [DISCONTINUED] norgestimate-ethinyl estradiol (Sprintec 28) 0.25-35 MG-MCG per tablet Take 1 tablet by mouth Daily. (Patient not taking: Reported on 2024) 84 tablet 0    [DISCONTINUED] PRENATAL GUMMY VITAMIN Chew 1 each Daily. (Patient not taking: Reported on 3/19/2024)      [DISCONTINUED] Psyllium 43 % powder Take 1 dose by mouth Daily. (Patient not taking: Reported on 2024) 538 g 1     No current facility-administered medications on file prior to visit.       Social History     Socioeconomic History    Marital status: Single   Tobacco Use     "Smoking status: Never    Smokeless tobacco: Never   Vaping Use    Vaping status: Never Used   Substance and Sexual Activity    Alcohol use: Yes     Comment: occa    Drug use: Yes     Types: Marijuana    Sexual activity: Yes     Partners: Male     Birth control/protection: Nexplanon       Occupation/Lives with: ***. Lives with ***    Sleep: Gets *** hours of sleep/night    Physical activity: ***    Nutrtion: ***    Elimination: Stools regularly, formed but soft, daily. No concerns.    Dental:   Dental problems or concerns: none  Hygiene: brushes and flosses regularly  Dental visit frequency: last visit ***  Has been to dentist in last 6 months: ***    Caffeine: ***    Substance use:   Is there any second-hand smoke exposure in the home or car?  Personal hx of vape/E-cig use?  Alcohol/Tobb/Drugs/Steroids: denies any alcohol, tobacco, or illicit drug use    Sexual hx (#partners, m/f, bc, LMP): ***    Mood: ***    Sports physical history:  -no difficulty participating in sports in the past  -no syncope  -no concussion history  -no family history of early cardiac or unexplained death  -no history of asthma  -no chest pain with exertion    Social hx:   -Friends  -Bullying:  -Social media use:  -Screen time: ***hours/day    Safety:   Car safety: measures in place (riding in backseat until 13 y/o, car seat/booster until 57')  Guns in home: no guns in child's home   Violence in home: denies violence in the home    Health Maintenance/Labs: ***    Specialists: ***  ___________________________  Review of Systems   Denies dizziness, fatigue, fever/chills, CP, SOA, headache, blood in urine/stool, abd pain, leg swelling.    Objective   Vitals:    06/19/24 0815   BP: 110/72   Pulse: 73   SpO2: 100%   Weight: 57.3 kg (126 lb 6.4 oz)   Height: 152.4 cm (60\")     Body mass index is 24.69 kg/m².    Physical Exam    Procedures     Assessment & Plan   There are no diagnoses linked to this encounter.   Plan:     ***    BMI is within " normal parameters. No other follow-up for BMI required.     Return in about 4 weeks (around 7/17/2024) for Annual physical.     Counseled on a healthy diet. Use condoms 100% of time with sex as IUD does not protect against STIs. Eat a healthy diet and exercise routinely. Avoid smoking/alcohol and drugs. Use seatbelt 100% of time.     Discussed Care Gaps, ordered referrals and encouraged vaccination updates.       - Pt agrees with plan of care and denies further questions/concerns today  - This document is intended for medical expert use only. Persons  reading this document without medical staff guidance may result in misinterpretation and unintended morbidity     Go to the ER for any possible life-threatening symptoms such as chest pain or shortness of air.      Please allow 3-5 business days for recommendations based on new results      I personally spent time with this patient, preparing for the visit, reviewing tests, obtaining and/or reviewing a separately obtained history, performing a medically appropriate examination and/or evaluation, counseling and educating the patient/family/caregiver, ordering medications,  documenting information in the medical record and indepentently interpreting results.         {Time Spent-Use for E/M Coding (Optional):32348}

## 2024-06-19 NOTE — PROGRESS NOTES
Subjective   Odette Gar is a 24 y.o. female.     Chief Complaint   Patient presents with    Establish Care    Anxiety   .  History of Present Illness     The following portions of the patient's history were reviewed and updated as appropriate: allergies, current medications, past family history, past medical history, past social history, past surgical history and problem list.    Review of Systems   Denies dizziness, fatigue, fever/chills, CP, SOA, headache, blood in urine/stool, abd pain, leg swelling.    Objective     Vitals:    06/19/24 0815   BP: 110/72   Pulse: 73   SpO2: 100%      Body mass index is 24.69 kg/m².    Physical Exam      Assessment & Plan   There are no diagnoses linked to this encounter.  Plan:     ***      Discussed Care Gaps, ordered referrals and encouraged vaccination updates.       - Pt agrees with plan of care and denies further questions/concerns today  - This document is intended for medical expert use only. Persons  reading this document without medical staff guidance may result in misinterpretation and unintended morbidity     Go to the ER for any possible life-threatening symptoms such as chest pain or shortness of air.      Please allow 3-5 business days for recommendations based on new results      I personally spent time with this patient, preparing for the visit, reviewing tests, obtaining and/or reviewing a separately obtained history, performing a medically appropriate examination and/or evaluation, counseling and educating the patient/family/caregiver, ordering medications,  documenting information in the medical record and indepentently interpreting results.

## 2024-06-21 ENCOUNTER — PATIENT ROUNDING (BHMG ONLY) (OUTPATIENT)
Dept: FAMILY MEDICINE CLINIC | Facility: CLINIC | Age: 24
End: 2024-06-21
Payer: COMMERCIAL

## 2024-06-21 NOTE — PROGRESS NOTES
A [employer] message has been sent to the patient for PATIENT ROUNDING with [facility].

## 2024-07-23 DIAGNOSIS — R11.2 NAUSEA AND VOMITING, UNSPECIFIED VOMITING TYPE: Primary | ICD-10-CM

## 2024-07-23 RX ORDER — ONDANSETRON 4 MG/1
4 TABLET, ORALLY DISINTEGRATING ORAL EVERY 8 HOURS PRN
Qty: 21 TABLET | Refills: 0 | Status: SHIPPED | OUTPATIENT
Start: 2024-07-23

## 2024-07-31 ENCOUNTER — TELEMEDICINE (OUTPATIENT)
Dept: FAMILY MEDICINE CLINIC | Facility: CLINIC | Age: 24
End: 2024-07-31
Payer: COMMERCIAL

## 2024-07-31 DIAGNOSIS — F41.1 GENERALIZED ANXIETY DISORDER WITH PANIC ATTACKS: ICD-10-CM

## 2024-07-31 DIAGNOSIS — F41.0 GENERALIZED ANXIETY DISORDER WITH PANIC ATTACKS: ICD-10-CM

## 2024-07-31 PROCEDURE — 99213 OFFICE O/P EST LOW 20 MIN: CPT

## 2024-07-31 RX ORDER — FLUOXETINE 10 MG/1
10 CAPSULE ORAL DAILY
Qty: 90 CAPSULE | Refills: 1 | Status: SHIPPED | OUTPATIENT
Start: 2024-07-31

## 2024-07-31 NOTE — PROGRESS NOTES
Subjective   Odette Gar is a 24 y.o. female.  Presenting today for a MyChart video visit. Patient's location at home, and provider's location in-office at [facility]. Patient gave consent to discuss concerns via Telemedicine.      Chief Complaint   Patient presents with    Anxiety     Follow up     History of Present Illness     Anxiety f/u: Patient is doing well on 10 mg of Prozac.  She notes an improvement in her anxiety.  Would like to continue at current dose and recheck in a few months.  She does note that for the past few weeks starting around July 6 she has intermittently felt nauseated and vomited.  Around the same time she began taking a birth control pill.  She has an appointment with OB/GYN tomorrow to discuss.    The following portions of the patient's history were reviewed and updated as appropriate: allergies, current medications, past family history, past medical history, past social history, past surgical history and problem list.    Review of Systems   Denies dizziness, fatigue, fever/chills, CP, SOA, headache, blood in urine/stool, abd pain, leg swelling.    Objective     SHADY d/t video visit    There were no vitals filed for this visit.   There is no height or weight on file to calculate BMI.    Physical Exam  SHADY d/t video visit    Assessment & Plan   Diagnoses and all orders for this visit:    1. Generalized anxiety disorder with panic attacks  -     FLUoxetine (PROzac) 10 MG capsule; Take 1 capsule by mouth Daily.  Dispense: 90 capsule; Refill: 1      Plan:     Continue 10mg prozac. Will do 3-month video visit to f/u.  Sent zofran for nausea. F/u with OB/GYN.    Discussed Care Gaps, ordered referrals and encouraged vaccination updates.       - Pt agrees with plan of care and denies further questions/concerns today  - This document is intended for medical expert use only. Persons  reading this document without medical staff guidance may result in misinterpretation and unintended  morbidity     Go to the ER for any possible life-threatening symptoms such as chest pain or shortness of air.      Please allow 3-5 business days for recommendations based on new results      I personally spent time with this patient, preparing for the visit, reviewing tests, obtaining and/or reviewing a separately obtained history, performing a medically appropriate examination and/or evaluation, counseling and educating the patient/family/caregiver, ordering medications,  documenting information in the medical record and indepentently interpreting results.

## 2024-08-16 ENCOUNTER — OFFICE VISIT (OUTPATIENT)
Dept: FAMILY MEDICINE CLINIC | Facility: CLINIC | Age: 24
End: 2024-08-16
Payer: COMMERCIAL

## 2024-08-16 VITALS
BODY MASS INDEX: 22.78 KG/M2 | OXYGEN SATURATION: 99 % | SYSTOLIC BLOOD PRESSURE: 108 MMHG | HEIGHT: 60 IN | DIASTOLIC BLOOD PRESSURE: 60 MMHG | WEIGHT: 116 LBS | TEMPERATURE: 98.8 F | HEART RATE: 86 BPM

## 2024-08-16 DIAGNOSIS — N89.8 VAGINAL DISCHARGE: ICD-10-CM

## 2024-08-16 DIAGNOSIS — J01.00 ACUTE NON-RECURRENT MAXILLARY SINUSITIS: ICD-10-CM

## 2024-08-16 DIAGNOSIS — R11.2 NAUSEA AND VOMITING, UNSPECIFIED VOMITING TYPE: Primary | ICD-10-CM

## 2024-08-16 DIAGNOSIS — R63.4 RECENT UNEXPLAINED WEIGHT LOSS: ICD-10-CM

## 2024-08-16 LAB
B-HCG UR QL: NEGATIVE
EXPIRATION DATE: NORMAL
INTERNAL NEGATIVE CONTROL: NORMAL
INTERNAL POSITIVE CONTROL: NORMAL
Lab: NORMAL

## 2024-08-16 PROCEDURE — 99213 OFFICE O/P EST LOW 20 MIN: CPT

## 2024-08-16 PROCEDURE — 81025 URINE PREGNANCY TEST: CPT

## 2024-08-16 RX ORDER — METHYLPREDNISOLONE 4 MG/1
TABLET ORAL
Qty: 21 TABLET | Refills: 0 | Status: SHIPPED | OUTPATIENT
Start: 2024-08-16

## 2024-08-16 NOTE — PROGRESS NOTES
Subjective   Odette Gar is a 24 y.o. female.     Chief Complaint   Patient presents with    Cough     X 3 days    Vomiting     X 1 day    Nasal Congestion     X 3 days     History of Present Illness  Ports that starting approximately 3 days ago, every time she stood up she felt lightheaded and dizzy.  She has been feeling sweaty at night.  Denies ear pain.  Ate spicy food yesterday and states that she felt like her ears were draining.  Her sinuses are tight and itchy.  New onset fatigue.  No known illness exposure.  She has had 3 different instances of similar illness, starting back in July 4, then 7/23, and now today.  Sinusitis  This is a new problem. The current episode started in the past 7 days (x3 days). The problem is unchanged. There has been no fever. Associated symptoms include congestion, coughing, diaphoresis, headaches and sinus pressure. Past treatments include nothing.      Nausea/vomiting: Patient states that she vomited once last night and twice this morning.  No blood in vomit.  Last night it contained food contents, this morning empty stomach contents.  No suspicious food intake.  No fever.  No abdominal pain or cramping.  She does have current diagnoses of anxiety.  States that she moved into a house last week.  Family history in mother of diverticulosis.  She had a similar episode back on 7/23, was prescribed Zofran but has not taken it yet.  Intends to  today.  She denies constipation or diarrhea.  Since last month.  Patient has lost 10 pounds for unknown reasons.    The following portions of the patient's history were reviewed and updated as appropriate: allergies, current medications, past family history, past medical history, past social history, past surgical history and problem list.    Review of Systems   Denies fever, CP, SOA, blood in urine/stool, abd pain, leg swelling.    Objective     Vitals:    08/16/24 1039   BP: 108/60   Pulse: 86   Temp: 98.8 °F (37.1 °C)   SpO2: 99%       Body mass index is 22.65 kg/m².    Physical Exam  Vitals reviewed.   Constitutional:       General: She is not in acute distress.     Appearance: Normal appearance. She is not ill-appearing or toxic-appearing.   HENT:      Right Ear: Tympanic membrane, ear canal and external ear normal.      Left Ear: Tympanic membrane, ear canal and external ear normal.      Nose: No rhinorrhea.      Right Sinus: Maxillary sinus tenderness present.      Left Sinus: Maxillary sinus tenderness present.      Mouth/Throat:      Mouth: Mucous membranes are moist.      Pharynx: Oropharynx is clear. No oropharyngeal exudate or posterior oropharyngeal erythema.   Eyes:      Extraocular Movements: Extraocular movements intact.      Conjunctiva/sclera: Conjunctivae normal.      Pupils: Pupils are equal, round, and reactive to light.   Cardiovascular:      Rate and Rhythm: Normal rate and regular rhythm.      Pulses: Normal pulses.      Heart sounds: Normal heart sounds.   Pulmonary:      Effort: Pulmonary effort is normal. No respiratory distress.      Breath sounds: Normal breath sounds.   Abdominal:      General: Bowel sounds are normal.      Palpations: Abdomen is soft.      Tenderness: There is abdominal tenderness in the suprapubic area and left lower quadrant.      Comments: Mild tenderness to palpation in LLQ and suprapubic area   Lymphadenopathy:      Cervical: No cervical adenopathy.   Skin:     General: Skin is warm and dry.      Capillary Refill: Capillary refill takes less than 2 seconds.   Neurological:      General: No focal deficit present.      Mental Status: She is alert and oriented to person, place, and time.      Motor: No weakness.   Psychiatric:         Mood and Affect: Mood normal.         Behavior: Behavior normal.         Thought Content: Thought content normal.         Judgment: Judgment normal.       Assessment & Plan   Diagnoses and all orders for this visit:    1. Acute non-recurrent maxillary sinusitis  (Primary)  -     methylPREDNISolone (MEDROL) 4 MG dose pack; Take as directed on package instructions.  Dispense: 21 tablet; Refill: 0    2. Nausea and vomiting, unspecified vomiting type  -     CBC w AUTO Differential  -     Comprehensive metabolic panel  -     Amylase  -     Lipase    3. Recent unexplained weight loss      Plan:     Complete labs today in office, or as soon as possible  Take prescribed medications per instructions.   If nausea/vomiting persists, contact provider and will refer to GI for further workup.    Discussed Care Gaps, ordered referrals and encouraged vaccination updates.       - Pt agrees with plan of care and denies further questions/concerns today  - This document is intended for medical expert use only. Persons  reading this document without medical staff guidance may result in misinterpretation and unintended morbidity     Go to the ER for any possible life-threatening symptoms such as chest pain or shortness of air.      Please allow 3-5 business days for recommendations based on new results      I personally spent time with this patient, preparing for the visit, reviewing tests, obtaining and/or reviewing a separately obtained history, performing a medically appropriate examination and/or evaluation, counseling and educating the patient/family/caregiver, ordering medications,  documenting information in the medical record and indepentently interpreting results.

## 2024-08-17 LAB
ALBUMIN SERPL-MCNC: 4.7 G/DL (ref 4–5)
ALP SERPL-CCNC: 59 IU/L (ref 44–121)
ALT SERPL-CCNC: 12 IU/L (ref 0–32)
AMYLASE SERPL-CCNC: 43 U/L (ref 31–110)
AST SERPL-CCNC: 17 IU/L (ref 0–40)
BASOPHILS # BLD AUTO: 0.1 X10E3/UL (ref 0–0.2)
BASOPHILS NFR BLD AUTO: 1 %
BILIRUB SERPL-MCNC: 0.4 MG/DL (ref 0–1.2)
BUN SERPL-MCNC: 9 MG/DL (ref 6–20)
BUN/CREAT SERPL: 12 (ref 9–23)
CALCIUM SERPL-MCNC: 9.8 MG/DL (ref 8.7–10.2)
CHLORIDE SERPL-SCNC: 101 MMOL/L (ref 96–106)
CO2 SERPL-SCNC: 23 MMOL/L (ref 20–29)
CREAT SERPL-MCNC: 0.75 MG/DL (ref 0.57–1)
EGFRCR SERPLBLD CKD-EPI 2021: 114 ML/MIN/1.73
EOSINOPHIL # BLD AUTO: 0 X10E3/UL (ref 0–0.4)
EOSINOPHIL NFR BLD AUTO: 0 %
ERYTHROCYTE [DISTWIDTH] IN BLOOD BY AUTOMATED COUNT: 12.1 % (ref 11.7–15.4)
GLOBULIN SER CALC-MCNC: 2.4 G/DL (ref 1.5–4.5)
GLUCOSE SERPL-MCNC: 95 MG/DL (ref 70–99)
HCT VFR BLD AUTO: 41.7 % (ref 34–46.6)
HGB BLD-MCNC: 13.4 G/DL (ref 11.1–15.9)
IMM GRANULOCYTES # BLD AUTO: 0 X10E3/UL (ref 0–0.1)
IMM GRANULOCYTES NFR BLD AUTO: 0 %
LIPASE SERPL-CCNC: 19 U/L (ref 14–72)
LYMPHOCYTES # BLD AUTO: 2 X10E3/UL (ref 0.7–3.1)
LYMPHOCYTES NFR BLD AUTO: 22 %
MCH RBC QN AUTO: 29.6 PG (ref 26.6–33)
MCHC RBC AUTO-ENTMCNC: 32.1 G/DL (ref 31.5–35.7)
MCV RBC AUTO: 92 FL (ref 79–97)
MONOCYTES # BLD AUTO: 0.6 X10E3/UL (ref 0.1–0.9)
MONOCYTES NFR BLD AUTO: 7 %
NEUTROPHILS # BLD AUTO: 6.5 X10E3/UL (ref 1.4–7)
NEUTROPHILS NFR BLD AUTO: 70 %
PLATELET # BLD AUTO: 211 X10E3/UL (ref 150–450)
POTASSIUM SERPL-SCNC: 4.4 MMOL/L (ref 3.5–5.2)
PROT SERPL-MCNC: 7.1 G/DL (ref 6–8.5)
RBC # BLD AUTO: 4.53 X10E6/UL (ref 3.77–5.28)
SODIUM SERPL-SCNC: 140 MMOL/L (ref 134–144)
WBC # BLD AUTO: 9.2 X10E3/UL (ref 3.4–10.8)

## 2024-08-22 DIAGNOSIS — F41.0 GENERALIZED ANXIETY DISORDER WITH PANIC ATTACKS: Primary | ICD-10-CM

## 2024-08-22 DIAGNOSIS — F41.1 GENERALIZED ANXIETY DISORDER WITH PANIC ATTACKS: Primary | ICD-10-CM

## 2024-08-22 RX ORDER — FLUOXETINE 20 MG/1
20 TABLET, FILM COATED ORAL DAILY
Qty: 90 TABLET | Refills: 0 | Status: SHIPPED | OUTPATIENT
Start: 2024-08-22

## 2024-10-18 ENCOUNTER — TELEPHONE (OUTPATIENT)
Dept: FAMILY MEDICINE CLINIC | Facility: CLINIC | Age: 24
End: 2024-10-18
Payer: COMMERCIAL

## 2024-10-18 DIAGNOSIS — Z11.1 TUBERCULOSIS SCREENING: Primary | ICD-10-CM

## 2024-10-18 NOTE — TELEPHONE ENCOUNTER
Patient is needing a TB blood test today done for her job. Can order be placed and patient called to come in? [phone]

## 2024-10-21 ENCOUNTER — TELEPHONE (OUTPATIENT)
Dept: FAMILY MEDICINE CLINIC | Facility: CLINIC | Age: 24
End: 2024-10-21

## 2024-10-21 DIAGNOSIS — K64.9 HEMORRHOIDS, UNSPECIFIED HEMORRHOID TYPE: Primary | ICD-10-CM

## 2024-10-21 NOTE — TELEPHONE ENCOUNTER
Caller: Odette Young    Relationship: Self    Best call back number: [phone]     Caller requesting test results: ODETTE YOUNG    What test was performed: LABS    When was the test performed: 10/18/24    Where was the test performed: [facility]    Additional notes: PATIENT IS CALLING TO REQUEST THE RESULTS OF LABS THAT WERE DONE ON 10/18/24.  SHE IS WANTING TO KNOW IF THOSE CAN BE SENT TO HER MY CHART.    PLEASE ADVISE.

## 2024-10-22 LAB
GAMMA INTERFERON BACKGROUND BLD IA-ACNC: 0.01 IU/ML
M TB IFN-G BLD-IMP: NEGATIVE
M TB IFN-G CD4+ BCKGRND COR BLD-ACNC: 0.01 IU/ML
M TB IFN-G CD4+CD8+ BCKGRND COR BLD-ACNC: 0.01 IU/ML
MITOGEN IGNF BCKGRD COR BLD-ACNC: >10 IU/ML
QUANTIFERON INCUBATION: NORMAL
SERVICE CMNT-IMP: NORMAL

## 2024-10-28 ENCOUNTER — TELEMEDICINE (OUTPATIENT)
Dept: FAMILY MEDICINE CLINIC | Facility: CLINIC | Age: 24
End: 2024-10-28
Payer: COMMERCIAL

## 2024-10-28 DIAGNOSIS — Z53.21 PROCEDURE AND TREATMENT NOT CARRIED OUT DUE TO PATIENT LEAVING PRIOR TO BEING SEEN BY HEALTH CARE PROVIDER: Primary | ICD-10-CM

## 2024-11-21 ENCOUNTER — OFFICE VISIT (OUTPATIENT)
Dept: FAMILY MEDICINE CLINIC | Facility: CLINIC | Age: 24
End: 2024-11-21

## 2024-11-21 VITALS
WEIGHT: 118.2 LBS | OXYGEN SATURATION: 100 % | HEART RATE: 77 BPM | TEMPERATURE: 97.9 F | DIASTOLIC BLOOD PRESSURE: 64 MMHG | SYSTOLIC BLOOD PRESSURE: 104 MMHG | BODY MASS INDEX: 23.2 KG/M2 | HEIGHT: 60 IN

## 2024-11-21 DIAGNOSIS — J02.0 STREP PHARYNGITIS: Primary | ICD-10-CM

## 2024-11-21 PROCEDURE — 99213 OFFICE O/P EST LOW 20 MIN: CPT | Performed by: NURSE PRACTITIONER

## 2024-11-21 RX ORDER — PENICILLIN V POTASSIUM 500 MG/1
500 TABLET, FILM COATED ORAL 3 TIMES DAILY
Qty: 30 TABLET | Refills: 0 | Status: SHIPPED | OUTPATIENT
Start: 2024-11-21

## 2024-11-21 NOTE — PROGRESS NOTES
Ami Gar is a 24 y.o. female.     Chief Complaint   Patient presents with    URI     Sore throat,  Head ache   Cough with Chest congestion Several members in Family have been sick with URI  1 with Pneumonia       History of Present Illness     HPI:  Sore throat x 24 hours, woke up with headache this morning, entire family to include extended with URI's.      The following portions of the patient's history were reviewed and updated as appropriate: allergies, current medications, past family history, past medical history, past social history, past surgical history and problem list.    Results         Objective     Vitals:    11/21/24 0923   BP: 104/64   Pulse: 77   Temp: 97.9 °F (36.6 °C)   SpO2: 100%      Body mass index is 23.08 kg/m².    Physical Exam  Constitutional:       Appearance: Normal appearance.   HENT:      Head: Normocephalic and atraumatic.      Comments: Throat is edematous, erythemic, white spots in throat, will treat for Strep by Amara criteria score 4.     Nose: Nose normal.      Mouth/Throat:      Mouth: Mucous membranes are moist.   Eyes:      Pupils: Pupils are equal, round, and reactive to light.   Cardiovascular:      Rate and Rhythm: Normal rate and regular rhythm.      Pulses: Normal pulses.      Heart sounds: Normal heart sounds.   Pulmonary:      Effort: Pulmonary effort is normal.      Breath sounds: Normal breath sounds.   Abdominal:      General: Bowel sounds are normal.   Musculoskeletal:         General: Normal range of motion.      Cervical back: Normal range of motion.   Skin:     General: Skin is warm and dry.      Capillary Refill: Capillary refill takes less than 2 seconds.   Neurological:      General: No focal deficit present.      Mental Status: She is alert.   Psychiatric:         Mood and Affect: Mood normal.         Assessment & Plan    Diagnoses and all orders for this visit:    1. Strep pharyngitis (Primary)  -     penicillin v potassium (VEETID) 500 MG  tablet; Take 1 tablet by mouth 3 (Three) Times a Day.  Dispense: 30 tablet; Refill: 0       Body mass index is 23.08 kg/m².      Discussed Care Gaps, ordered referrals and encouraged vaccination updates.       - Pt agrees with plan of care and denies further questions/concerns today  - This document is intended for medical expert use only. Persons  reading this document without medical staff guidance may result in misinterpretation and unintended morbidity     Go to the ER for any possible life-threatening symptoms such as chest pain or shortness of air.      Please allow 3-5 business days for recommendations based on new results      I personally spent time with this patient, preparing for the visit, reviewing tests, obtaining and/or reviewing a separately obtained history, performing a medically appropriate examination and/or evaluation, counseling and educating the patient/family/caregiver, ordering medications,  documenting information in the medical record and indepentently interpreting results.         MDM:  Amara Criteria = 4.  Treating empirically, patient does not have insurance.

## 2025-02-14 ENCOUNTER — TELEPHONE (OUTPATIENT)
Dept: OBSTETRICS AND GYNECOLOGY | Facility: CLINIC | Age: 25
End: 2025-02-14
Payer: COMMERCIAL

## 2025-02-21 ENCOUNTER — OFFICE VISIT (OUTPATIENT)
Dept: FAMILY MEDICINE CLINIC | Facility: CLINIC | Age: 25
End: 2025-02-21
Payer: COMMERCIAL

## 2025-02-21 VITALS
HEIGHT: 60 IN | TEMPERATURE: 98.8 F | DIASTOLIC BLOOD PRESSURE: 58 MMHG | OXYGEN SATURATION: 100 % | HEART RATE: 77 BPM | SYSTOLIC BLOOD PRESSURE: 100 MMHG | WEIGHT: 116.6 LBS | BODY MASS INDEX: 22.89 KG/M2

## 2025-02-21 DIAGNOSIS — B34.9 ACUTE VIRAL SYNDROME: Primary | ICD-10-CM

## 2025-02-21 DIAGNOSIS — R11.2 NAUSEA AND VOMITING, UNSPECIFIED VOMITING TYPE: ICD-10-CM

## 2025-02-21 DIAGNOSIS — Z20.828 EXPOSURE TO THE FLU: ICD-10-CM

## 2025-02-21 LAB
EXPIRATION DATE: NORMAL
FLUAV AG UPPER RESP QL IA.RAPID: NOT DETECTED
FLUBV AG UPPER RESP QL IA.RAPID: NOT DETECTED
INTERNAL CONTROL: NORMAL
Lab: NORMAL
SARS-COV-2 AG UPPER RESP QL IA.RAPID: NOT DETECTED

## 2025-02-21 PROCEDURE — 99213 OFFICE O/P EST LOW 20 MIN: CPT

## 2025-02-21 PROCEDURE — 87428 SARSCOV & INF VIR A&B AG IA: CPT

## 2025-02-21 RX ORDER — OSELTAMIVIR PHOSPHATE 75 MG/1
75 CAPSULE ORAL 2 TIMES DAILY
Qty: 10 CAPSULE | Refills: 0 | Status: SHIPPED | OUTPATIENT
Start: 2025-02-21 | End: 2025-02-26

## 2025-02-21 RX ORDER — ONDANSETRON 4 MG/1
4 TABLET, ORALLY DISINTEGRATING ORAL EVERY 8 HOURS PRN
Qty: 30 TABLET | Refills: 0 | Status: SHIPPED | OUTPATIENT
Start: 2025-02-21

## 2025-02-21 NOTE — PROGRESS NOTES
Subjective   Odette Gar is a 24 y.o. female.     Patient or patient representative verbalized consent for the use of Ambient Listening during the visit with  ROSELINE Jose for chart documentation. 2/21/2025  18:02 EST    Chief Complaint   Patient presents with    Headache     X 2 days    Abdominal Pain     X 2 days    Cough    Vomiting     X 1 days     History of Present Illness  The patient is a 24-year-old female who presents for evaluation of headache, vomiting, cough, and abdominal pain.    Headache  She began experiencing symptoms 2 days ago, which include a headache that intensifies upon movement but subsides when she is in a supine position. She reports a sensation of pressure in her head upon standing. She also experiences pressure under her eyes and in her head. She has been experiencing fatigue and has been sleeping excessively. She took a day off from work due to her headache.  - Onset: 2 days ago.  - Location: Head, pressure under eyes.  - Duration: Persistent for 2 days.  - Character: Intensifies upon movement, subsides in supine position, pressure sensation.  - Alleviating/Aggravating Factors: Supine position alleviates, movement aggravates.  - Severity: Severe enough to take a day off from work.    Vomiting and Abdominal Pain  She has experienced 3 episodes of vomiting today, which were followed by severe abdominal cramping. The onset of her vomiting was at 0400 hours this morning, with subsequent episodes around 1200 hours and just prior to leaving her residence. She reports no presence of blood in her vomit or any febrile symptoms. She reports frequent bowel movements but no diarrhea. She has decreased her food intake today but maintained a normal diet over the past 2 days.  - Onset: Vomiting started at 0400 hours this morning.  - Duration: Episodes at 0400 hours, 1200 hours, and just prior to leaving her residence.  - Character: Vomiting followed by severe abdominal cramping.  - Severity:  Severe abdominal cramping.    Cough  She has a dry cough and had a runny nose for the past 2 days, which has since resolved. She experienced mild chest pain on the first day of her illness, but this symptom has not persisted.  - Onset: 2 days ago.  - Duration: Runny nose resolved, mild chest pain on the first day.  - Character: Dry cough, runny nose, mild chest pain.  - Severity: Mild chest pain, runny nose resolved.    Supplemental Information  She has not conducted any home tests for influenza or COVID-19. Her daughter had rhinovirus 3 weeks ago and then got an ear infection, now she has flu and her fiancée was just diagnosed with flu yesterday. She took ibuprofen at 0800 hours and slept for 6 hours. She plans to make another appointment to discuss anxiety medication as she feels Prozac is not effective. She is currently using Nexplanon for contraception.    MEDICATIONS  - Current: Nexplanon, ibuprofen, Prozac    The following portions of the patient's history were reviewed and updated as appropriate: allergies, current medications, past family history, past medical history, past social history, past surgical history and problem list.    Results  - Laboratory Studies:    - COVID-19 test: Negative    - Influenza A test: Negative    - Influenza B test: Negative       Objective     Vitals:    02/21/25 1735   BP: 100/58   Pulse: 77   Temp: 98.8 °F (37.1 °C)   SpO2: 100%      Body mass index is 22.77 kg/m².    Physical Exam  Vitals reviewed.   Constitutional:       Appearance: She is ill-appearing. She is not toxic-appearing.   HENT:      Right Ear: Tympanic membrane, ear canal and external ear normal.      Left Ear: Tympanic membrane, ear canal and external ear normal.      Nose:      Right Sinus: Maxillary sinus tenderness present.      Left Sinus: Maxillary sinus tenderness present.      Mouth/Throat:      Mouth: Mucous membranes are moist.      Pharynx: Oropharynx is clear. No oropharyngeal exudate or posterior  oropharyngeal erythema.   Eyes:      Conjunctiva/sclera: Conjunctivae normal.   Cardiovascular:      Rate and Rhythm: Normal rate and regular rhythm.   Pulmonary:      Effort: Pulmonary effort is normal.      Breath sounds: Normal breath sounds.   Abdominal:      Tenderness:  in the epigastric area   Lymphadenopathy:      Cervical: No cervical adenopathy.   Skin:     General: Skin is warm and dry.   Neurological:      Mental Status: She is alert and oriented to person, place, and time.   Psychiatric:         Behavior: Behavior normal.         Assessment & Plan  1. Suspected viral infection  - Recent exposure to influenza within the past 2 days  - Initiate antiviral therapy with Tamiflu prophylactically  - Prescription for Tamiflu: Twice daily for 5 days  - Prescription for Zofran to manage nausea  - Continue taking ibuprofen for relief  - Alternate between ibuprofen and Tylenol if fever develops  - Maintain hydration with small sips of water and Gatorade  - Ensure adequate rest over the weekend  - Use over-the-counter medications (Delsym or Robitussin), teas with honey, and a humidifier if cough becomes bothersome  - Inform us immediately if condition does not improve by Monday or if new symptoms arise    2. Headache  - Worsens when moving or sitting up, improves when lying down  - Continue with ibuprofen for relief  - May alternate with Tylenol if needed    3. Vomiting and abdominal pain  - Experienced 3 episodes of vomiting today, followed by intense cramping in the middle of her stomach  - Prescription for Zofran to manage nausea  - Maintain hydration and rest    4. Anxiety  - Reports Prozac has not been effective  - Plans to make another appointment to discuss alternative anxiety medications       Discussed Care Gaps, ordered referrals and encouraged vaccination updates.       - Pt agrees with plan of care and denies further questions/concerns today  - This document is intended for medical expert use only.  Persons  reading this document without medical staff guidance may result in misinterpretation and unintended morbidity     Go to the ER for any possible life-threatening symptoms such as chest pain or shortness of air.      Please allow 3-5 business days for recommendations based on new results      I personally spent time with this patient, preparing for the visit, reviewing tests, obtaining and/or reviewing a separately obtained history, performing a medically appropriate examination and/or evaluation, counseling and educating the patient/family/caregiver, ordering medications,  documenting information in the medical record and indepentently interpreting results.        negative...

## 2025-02-21 NOTE — LETTER
February 21, 2025    Odette Gar  [address] [place], [place] [id number]      To Whom it May Concern:       I have seen and evaluated Ms. Gar in my clinic today due to illness. Please do not hesitate to contact me with any further questions.       Kind Regards,    Julia Franz, APRN

## 2025-02-28 DIAGNOSIS — F41.0 GENERALIZED ANXIETY DISORDER WITH PANIC ATTACKS: Primary | ICD-10-CM

## 2025-02-28 DIAGNOSIS — F41.1 GENERALIZED ANXIETY DISORDER WITH PANIC ATTACKS: Primary | ICD-10-CM

## 2025-02-28 RX ORDER — ESCITALOPRAM OXALATE 5 MG/1
5 TABLET ORAL DAILY
Qty: 60 TABLET | Refills: 0 | Status: SHIPPED | OUTPATIENT
Start: 2025-02-28

## 2025-06-20 ENCOUNTER — TELEPHONE (OUTPATIENT)
Dept: OBSTETRICS AND GYNECOLOGY | Facility: CLINIC | Age: 25
End: 2025-06-20
Payer: COMMERCIAL

## 2025-06-20 ENCOUNTER — PATIENT MESSAGE (OUTPATIENT)
Dept: OBSTETRICS AND GYNECOLOGY | Facility: CLINIC | Age: 25
End: 2025-06-20
Payer: COMMERCIAL

## 2025-06-20 RX ORDER — METRONIDAZOLE 500 MG/1
500 TABLET ORAL 2 TIMES DAILY
Qty: 14 TABLET | Refills: 0 | Status: SHIPPED | OUTPATIENT
Start: 2025-06-20 | End: 2025-06-27

## 2025-06-20 NOTE — TELEPHONE ENCOUNTER
Patient submitted a MyChart appointment request, expressing concern that she may have bacterial vaginosis (BV). The earliest available appointment with a nurse practitioner is on July 8, 2025. Would you prefer to wait for the appointment, or would it be more appropriate to prescribe treatment at this time?          Please advise  ~Sheri